# Patient Record
Sex: MALE | Race: WHITE | NOT HISPANIC OR LATINO | URBAN - METROPOLITAN AREA
[De-identification: names, ages, dates, MRNs, and addresses within clinical notes are randomized per-mention and may not be internally consistent; named-entity substitution may affect disease eponyms.]

---

## 2018-07-10 PROBLEM — 428283002 HISTORY OF POLYP OF COLON (SITUATION): Status: ACTIVE | Noted: 2018-07-10

## 2018-07-10 PROBLEM — 414916001 OBESITY: Status: ACTIVE | Noted: 2018-07-10

## 2021-05-18 ENCOUNTER — OUT OF OFFICE VISIT (OUTPATIENT)
Dept: URBAN - METROPOLITAN AREA MEDICAL CENTER 8 | Facility: MEDICAL CENTER | Age: 66
End: 2021-05-18
Payer: MEDICARE

## 2021-05-18 DIAGNOSIS — I48.92 ATRIAL FLUTTER: ICD-10-CM

## 2021-05-18 DIAGNOSIS — R18.8 ABDOMINAL FLUID COLLECTION: ICD-10-CM

## 2021-05-18 DIAGNOSIS — D64.89 ANEMIA DUE TO OTHER CAUSE: ICD-10-CM

## 2021-05-18 PROCEDURE — 99232 SBSQ HOSP IP/OBS MODERATE 35: CPT | Performed by: INTERNAL MEDICINE

## 2021-05-18 PROCEDURE — 99223 1ST HOSP IP/OBS HIGH 75: CPT | Performed by: INTERNAL MEDICINE

## 2021-05-18 PROCEDURE — G8427 DOCREV CUR MEDS BY ELIG CLIN: HCPCS | Performed by: INTERNAL MEDICINE

## 2021-05-20 ENCOUNTER — OUT OF OFFICE VISIT (OUTPATIENT)
Dept: URBAN - METROPOLITAN AREA MEDICAL CENTER 8 | Facility: MEDICAL CENTER | Age: 66
End: 2021-05-20
Payer: MEDICARE

## 2021-05-20 DIAGNOSIS — D12.5 ADENOMA OF SIGMOID COLON: ICD-10-CM

## 2021-05-20 DIAGNOSIS — D64.89 ANEMIA DUE TO OTHER CAUSE: ICD-10-CM

## 2021-05-20 DIAGNOSIS — I85.00 ESOPHAGEAL  VARICOSE VEINS: ICD-10-CM

## 2021-05-20 PROCEDURE — G0500 MOD SEDAT ENDO SERVICE >5YRS: HCPCS | Performed by: INTERNAL MEDICINE

## 2021-05-20 PROCEDURE — 43235 EGD DIAGNOSTIC BRUSH WASH: CPT | Performed by: INTERNAL MEDICINE

## 2021-05-20 PROCEDURE — 45385 COLONOSCOPY W/LESION REMOVAL: CPT | Performed by: INTERNAL MEDICINE

## 2021-06-03 ENCOUNTER — LAB OUTSIDE AN ENCOUNTER (OUTPATIENT)
Dept: URBAN - METROPOLITAN AREA CLINIC 86 | Facility: CLINIC | Age: 66
End: 2021-06-03

## 2021-06-03 ENCOUNTER — OFFICE VISIT (OUTPATIENT)
Dept: URBAN - METROPOLITAN AREA CLINIC 86 | Facility: CLINIC | Age: 66
End: 2021-06-03
Payer: MEDICARE

## 2021-06-03 DIAGNOSIS — I85.00 ESOPHAGEAL VARICES DETERMINED BY ENDOSCOPY: ICD-10-CM

## 2021-06-03 DIAGNOSIS — R18.8 OTHER ASCITES: ICD-10-CM

## 2021-06-03 DIAGNOSIS — R74.8 ABNORMAL LIVER ENZYMES: ICD-10-CM

## 2021-06-03 DIAGNOSIS — K74.60 UNSPECIFIED CIRRHOSIS OF LIVER: ICD-10-CM

## 2021-06-03 DIAGNOSIS — Z71.89 VACCINE COUNSELING: ICD-10-CM

## 2021-06-03 PROCEDURE — 99204 OFFICE O/P NEW MOD 45 MIN: CPT

## 2021-06-03 RX ORDER — FUROSEMIDE 20 MG/1
1 TABLET TABLET ORAL ONCE A DAY
Status: ACTIVE | COMMUNITY

## 2021-06-03 RX ORDER — APIXABAN 2.5 MG/1
AS DIRECTED TABLET, FILM COATED ORAL
Status: ACTIVE | COMMUNITY

## 2021-06-03 RX ORDER — SPIRONOLACTONE 50 MG/1
1 TABLET TABLET, FILM COATED ORAL ONCE A DAY
Status: ACTIVE | COMMUNITY

## 2021-06-03 RX ORDER — CARVEDILOL 3.12 MG/1
1 TABLET WITH FOOD TABLET, FILM COATED ORAL TWICE A DAY
Status: ACTIVE | COMMUNITY

## 2021-06-03 NOTE — PHYSICAL EXAM GASTROINTESTINAL
Abdomen , soft, nontender, distended , no guarding or rigidity , no masses palpable , normal bowel sounds ,

## 2021-06-03 NOTE — HPI-TODAY'S VISIT:
Patient was referred by dr. hernandez for an evaluation of cirrhosis and ascites  A copy of this note will be sent to the referring provider.    pt is a 66 yo white male who was told he had fatty liver 2 years ago. states 'stomach getting bigger' in april timeframe. per pt was drinking more after jan 2021 and then stopped on may 15, 2021 when he was hospitalized. pt's albumin was wnl.   Pt with no ascites last year when screened for AAA via u/s. recently in the hospital at [facility]. he was found to have symptomatic anemia-received iv transfusion for this. pt was noted to have hx of etoh abuse. will complete the screens.  due to ascites he was started on lasix and aldactone.  due to varices on coreg as well.   CT triple phase 5/21/21 showed no definitive evidence of HCC, no enhancing liver masses, cirrhotic appearance of the liver with small to moderate intra-abdominal ascites and probable upper abdominal varices.  Esophageal and gastric varices may be present, patent portal veins.   Paracentesis 5/20/21 with 8.2L of fluid removed- path with numerous meseothelial cells and histiocytes, no atypical or malignant cells present.  Colonoscopy 5/20/21- severe left sided diverticulosis, tortuous left colon, 4 mm sessile sigmoid colon polyp removed, small nonbleeding internal hemorrhoids, limited exam beause of suboptimal prep, repeat 3 years.  EGD 5/20/21- 2 cords of small nonbleeding grade 1-2 esophgeal varices in the distal esophagus.    U/S doppler 5/19/21- hepatopetal flow of the liver. Pt here for follow up.  echo showed EF 55-60%  Last labs were may 2021: cr 0.89 tb 0.8 alt 20 alp 134 ast 58 wbc 6.2 hgb 9.8 plt 247   Duration of visit 45 mins with over 50% of the time explaining patients condition and treatment plan and reviewing records

## 2021-06-09 ENCOUNTER — TELEPHONE ENCOUNTER (OUTPATIENT)
Dept: URBAN - METROPOLITAN AREA CLINIC 92 | Facility: CLINIC | Age: 66
End: 2021-06-09

## 2021-06-09 LAB
A/G RATIO: 1.6
ACTIN (SMOOTH MUSCLE) ANTIBODY: 9
ALBUMIN: 4.1
ALKALINE PHOSPHATASE: 106
ALPHA-1-ANTITRYPSIN, SERUM: 155
ALT (SGPT): 9
ANA DIRECT: NEGATIVE
AST (SGOT): 34
BASO (ABSOLUTE): 0.1
BASOS: 1
BILIRUBIN, TOTAL: 0.9
BUN/CREATININE RATIO: 16
BUN: 13
CALCIUM: 9
CARBON DIOXIDE, TOTAL: 22
CERULOPLASMIN: 32
CHLORIDE: 103
CREATININE: 0.8
EGFR IF AFRICN AM: 108
EGFR IF NONAFRICN AM: 93
EOS (ABSOLUTE): 0.1
EOS: 1
FERRITIN, SERUM: 24
GLOBULIN, TOTAL: 2.6
GLUCOSE: 94
HCV GENOTYPE: (no result)
HCV LOG10: (no result)
HEMATOCRIT: 32.4
HEMATOLOGY COMMENTS:: (no result)
HEMOGLOBIN: 9.8
HEPATITIS C QUANTITATION: (no result)
IMMATURE CELLS: (no result)
IMMATURE GRANS (ABS): 0
IMMATURE GRANULOCYTES: 0
IRON BIND.CAP.(TIBC): 388
IRON SATURATION: 7
IRON: 28
LYMPHS (ABSOLUTE): 1.3
LYMPHS: 19
MCH: 24.9
MCHC: 30.2
MCV: 82
MITOCHONDRIAL (M2) ANTIBODY: <20
MONOCYTES(ABSOLUTE): 1.2
MONOCYTES: 17
NEUTROPHILS (ABSOLUTE): 4.4
NEUTROPHILS: 62
NRBC: (no result)
PHENOTYPE (PI): (no result)
PLATELETS: 239
POTASSIUM: 4.7
PROTEIN, TOTAL: 6.7
RBC: 3.94
RDW: 18
SODIUM: 141
TEST INFORMATION:: (no result)
UIBC: 360
WBC: 7

## 2021-06-09 NOTE — HPI-TODAY'S VISIT:
hep c negative, iron sat low 7%-follow up with pcp or gi for this. ceruloplasmin 32. ferritin low 24. asma neg. ama neg. alpha 1 MM. shavonne neg. ast 34, alt 9. hgb low 9.8-need to see gi or pcp. plt 239. monocytes 1200.

## 2021-07-01 ENCOUNTER — LAB OUTSIDE AN ENCOUNTER (OUTPATIENT)
Dept: URBAN - METROPOLITAN AREA CLINIC 86 | Facility: CLINIC | Age: 66
End: 2021-07-01

## 2021-07-27 ENCOUNTER — TELEPHONE ENCOUNTER (OUTPATIENT)
Dept: URBAN - METROPOLITAN AREA CLINIC 92 | Facility: CLINIC | Age: 66
End: 2021-07-27

## 2021-07-28 ENCOUNTER — TELEPHONE ENCOUNTER (OUTPATIENT)
Dept: URBAN - METROPOLITAN AREA CLINIC 92 | Facility: CLINIC | Age: 66
End: 2021-07-28

## 2021-07-28 NOTE — HPI-TODAY'S VISIT:
Mookie Geronimo, July 26 MRI back. Lower thorax was normal.  Liver showed some fat deposition as well as changes of chronic liver disease including lobar redistribution and nodular contour.  They also see some delayed particular enhancement of the liver which would be compatible with fibrosis. They see perfusion changes throughout the liver but no suspicious lesions.   Typically we redo an MRI in 6 months to follow these perfusion anomalies. They see varices near the esophagus, stomach and spleen area.  Some of the paraesophageal varices are submucosal.  So this is important to follow with egd as you are doing. The gallbladder and biliary tree was normal.   The spleen was 13.1 cm which many would consider slightly enlarged.  Pancreas was normal.  Simple left renal cyst was seen but no size given.  Some nonspecific lymph nodes were seen near the liver. They feel that those lymph nodes are likely reactive. You do have trace ascites so would be very important for you to remain on a low-salt diet and on your diuretics that you are on.  Low-salt diet is really the basis for the ascites control and then the diuretics help out more if you are already on that type of diet. Some mild degenerative changes were seen of the spine. Overall we would recommend repeat the MRI in 6 months. Important to note that on the previous CT from May of this year you had a cirrhotic appearance of liver but moderate intra-abdominal ascites that as well as the probable varices as well. The EGD was done on May 20 of this year and it showed 2 cords of small grade 1-2 esophageal varices that were seen. Please share this information with your providers. This will be reviewed further in person at your next visit.  Dr. Diego

## 2021-08-02 ENCOUNTER — TELEPHONE ENCOUNTER (OUTPATIENT)
Dept: URBAN - METROPOLITAN AREA SURGERY CENTER 30 | Facility: SURGERY CENTER | Age: 66
End: 2021-08-02

## 2021-08-03 ENCOUNTER — OFFICE VISIT (OUTPATIENT)
Dept: URBAN - METROPOLITAN AREA CLINIC 86 | Facility: CLINIC | Age: 66
End: 2021-08-03

## 2021-08-04 ENCOUNTER — TELEPHONE ENCOUNTER (OUTPATIENT)
Dept: URBAN - METROPOLITAN AREA CLINIC 92 | Facility: CLINIC | Age: 66
End: 2021-08-04

## 2021-08-04 LAB
A/G RATIO: 1.5
ALBUMIN: 4.6
ALKALINE PHOSPHATASE: 129
ALT (SGPT): 16
AST (SGOT): 53
BASO (ABSOLUTE): 0.1
BASOS: 1
BILIRUBIN, TOTAL: 1.7
BUN/CREATININE RATIO: 16
BUN: 14
CALCIUM: 9.6
CARBON DIOXIDE, TOTAL: 24
CHLORIDE: 102
CREATININE: 0.88
EGFR IF AFRICN AM: 103
EGFR IF NONAFRICN AM: 90
EOS (ABSOLUTE): 0.1
EOS: 1
GLOBULIN, TOTAL: 3
GLUCOSE: 109
HEMATOCRIT: 41.3
HEMATOLOGY COMMENTS:: (no result)
HEMOGLOBIN: 12.9
IMMATURE CELLS: (no result)
IMMATURE GRANS (ABS): 0
IMMATURE GRANULOCYTES: 0
INR: 1.2
LYMPHS (ABSOLUTE): 1.2
LYMPHS: 17
MCH: 27.4
MCHC: 31.2
MCV: 88
MONOCYTES(ABSOLUTE): 1.2
MONOCYTES: 16
NEUTROPHILS (ABSOLUTE): 4.6
NEUTROPHILS: 65
NRBC: (no result)
PLATELETS: 147
POTASSIUM: 4.6
PROTEIN, TOTAL: 7.6
PROTHROMBIN TIME: 12.4
RBC: 4.7
RDW: 21.1
SODIUM: 140
WBC: 7.1

## 2021-08-31 ENCOUNTER — OFFICE VISIT (OUTPATIENT)
Dept: URBAN - METROPOLITAN AREA CLINIC 86 | Facility: CLINIC | Age: 66
End: 2021-08-31
Payer: MEDICARE

## 2021-08-31 ENCOUNTER — TELEPHONE ENCOUNTER (OUTPATIENT)
Dept: URBAN - METROPOLITAN AREA CLINIC 86 | Facility: CLINIC | Age: 66
End: 2021-08-31

## 2021-08-31 VITALS
HEART RATE: 72 BPM | TEMPERATURE: 95.9 F | WEIGHT: 212.5 LBS | BODY MASS INDEX: 41.72 KG/M2 | SYSTOLIC BLOOD PRESSURE: 124 MMHG | DIASTOLIC BLOOD PRESSURE: 78 MMHG | HEIGHT: 60 IN

## 2021-08-31 DIAGNOSIS — Z71.89 VACCINE COUNSELING: ICD-10-CM

## 2021-08-31 DIAGNOSIS — Z79.899 HIGH RISK MEDICATION USE: ICD-10-CM

## 2021-08-31 DIAGNOSIS — R18.8 OTHER ASCITES: ICD-10-CM

## 2021-08-31 DIAGNOSIS — K76.0 FATTY LIVER: ICD-10-CM

## 2021-08-31 DIAGNOSIS — K74.69 OTHER CIRRHOSIS OF LIVER: ICD-10-CM

## 2021-08-31 DIAGNOSIS — I85.10 SECONDARY ESOPHAGEAL VARICES WITHOUT BLEEDING: ICD-10-CM

## 2021-08-31 DIAGNOSIS — E66.01 MORBID (SEVERE) OBESITY DUE TO EXCESS CALORIES: ICD-10-CM

## 2021-08-31 DIAGNOSIS — F10.10 ALCOHOL ABUSE: ICD-10-CM

## 2021-08-31 PROCEDURE — 99214 OFFICE O/P EST MOD 30 MIN: CPT | Performed by: PHYSICIAN ASSISTANT

## 2021-08-31 RX ORDER — NADOLOL 20 MG/1
2 TABLETS TABLET ORAL ONCE A DAY
Status: ACTIVE | COMMUNITY

## 2021-08-31 RX ORDER — APIXABAN 2.5 MG/1
AS DIRECTED TABLET, FILM COATED ORAL
Status: ACTIVE | COMMUNITY

## 2021-08-31 RX ORDER — LEVOTHYROXINE SODIUM 125 UG/1
1 TABLET IN THE MORNING ON AN EMPTY STOMACH TABLET ORAL ONCE A DAY
Status: ACTIVE | COMMUNITY

## 2021-08-31 RX ORDER — CARVEDILOL 3.12 MG/1
1 TABLET WITH FOOD TABLET, FILM COATED ORAL TWICE A DAY
Status: ACTIVE | COMMUNITY

## 2021-08-31 RX ORDER — FOLIC ACID 0.8 MG
1 TABLET TABLET ORAL ONCE A DAY
Status: ACTIVE | COMMUNITY

## 2021-08-31 RX ORDER — SPIRONOLACTONE 25 MG/1
1/2 TABLET TABLET, FILM COATED ORAL ONCE A DAY
Status: ACTIVE | COMMUNITY

## 2021-08-31 RX ORDER — METRONIDAZOLE 7.5 MG/G
1 APPLICATION CREAM TOPICAL TWICE A DAY
Status: ACTIVE | COMMUNITY

## 2021-08-31 RX ORDER — FUROSEMIDE 20 MG/1
1 TABLET TABLET ORAL ONCE A DAY
Status: ACTIVE | COMMUNITY

## 2021-08-31 NOTE — HPI-TODAY'S VISIT:
Patient is  a 66 yo white male who presents for follow up of cirrhosis with ascites due to fatty liver and alcohol, last seen 6/3/21 by Terra Benitez PA-C.  He was referred by Dr. Borges.   A copy of this note will be sent to the referring provider.  pt is a 66 yo white male who was told he had fatty liver 2 years ago. states 'stomach getting bigger' in april 2021 timeframe. per pt was drinking more after jan 2021 and then stopped on may 15, 2021 when he was hospitalized.  Has since started drinking again.    Currently drinking 2 glasses of wine x 4 days a week.  Previously drinking 3-4 drinks daily.   Encouraged absolute alcohol cessation and rehab/therapy.  Alcohol use and 5 lb weight gain likely cause of recent bump in liver enzymes.  8/12/21-  alk phos 111 (prior 129), ast 66 (prior 53), alt 23 (prior 16).  (No new herbals/supplements.  No exposure to chemicals/toxins).    Weight 237 11/2020.  205 1/2021 after stomach flu, 225 prior to para on 5/16/21.  Weight 216 in June 2021.  Weight 212 today.   Feels like weight has gone up 5 lbs in 2 weeks, not eating as healthy as previously and eating later in day.     Needs continued f/u with GI for EGD variceal screening and BRITNEY.  Most recent /21 showing trace ascites (prior moderate on CT in May 2021)- on lasix 20 mg, spironolactone 12.5 mg/day and doing well with this.  Eating less salt but could do better with this.  No HSE, no signs of GI bleeding, no abdominal pain, jaundice, pruritus, fevers, chills, increased abdominal distention.  labs 8/12/21- sodium 139, potassium 3.8, chloride 103, creatinine 0.77, tbili 0.9, alk phos 111, ast 66, alt 23, hgb 12.2, iron sat 48  labs 8/3/21- glucose 109, tb 1.7 elevated. alp 129 elevated, ast 53 elevated. alt 16. hgb 12.9 low but better. plt [id number]. monocytes 1.2 elevated. inr 1.2. MELD 10.  July 26 MRI- Lower thorax was normal.  Liver showed some fat deposition as well as changes of chronic liver disease including lobar redistribution and nodular contour.  They also see some delayed particular enhancement of the liver which would be compatible with fibrosis. They see perfusion changes throughout the liver but no suspicious lesions.   Typically we redo an MRI in 6 months to follow these perfusion anomalies. They see varices near the esophagus, stomach and spleen area.  Some of the paraesophageal varices are submucosal.  So this is important to follow with egd as you are doing. The gallbladder and biliary tree was normal.   The spleen was 13.1 cm which many would consider slightly enlarged.  Pancreas was normal.  Simple left renal cyst was seen but no size given.  Some nonspecific lymph nodes were seen near the liver. They feel that those lymph nodes are likely reactive. You do have trace ascites so would be very important for you to remain on a low-salt diet and on your diuretics that you are on.  Low-salt diet is really the basis for the ascites control and then the diuretics help out more if you are already on that type of diet. Some mild degenerative changes were seen of the spine. Overall we would recommend repeat the MRI in 6 months. Important to note that on the previous CT from May of this year you had a cirrhotic appearance of liver but moderate intra-abdominal ascites that as well as the probable varices as well. The EGD was done on May 20 of this year and it showed 2 cords of small grade 1-2 esophageal varices that were seen.  hep c negative, iron sat low 7%-follow up with pcp or gi for this. ceruloplasmin 32. ferritin low 24. asma neg. ama neg. alpha 1 MM. shavonne neg. ast 34, alt 9. hgb low 9.8-need to see gi or pcp. plt 239. monocytes 1200.   Pt with no ascites last year when screened for AAA via u/s. recently in the hospital at [facility]. he was found to have symptomatic anemia-received iv transfusion for this. pt was noted to have hx of etoh abuse. will complete the screens.  due to ascites he was started on lasix and aldactone.  due to varices on coreg as well.   CT triple phase 5/21/21 showed no definitive evidence of HCC, no enhancing liver masses, cirrhotic appearance of the liver with small to moderate intra-abdominal ascites and probable upper abdominal varices.  Esophageal and gastric varices may be present, patent portal veins.   Paracentesis 5/20/21 with 8.2L of fluid removed- path with numerous meseothelial cells and histiocytes, no atypical or malignant cells present.  Colonoscopy 5/20/21- severe left sided diverticulosis, tortuous left colon, 4 mm sessile sigmoid colon polyp removed, small nonbleeding internal hemorrhoids, limited exam beause of suboptimal prep, repeat 3 years.  EGD 5/20/21- 2 cords of small nonbleeding grade 1-2 esophgeal varices in the distal esophagus.    U/S doppler 5/19/21- hepatopetal flow of the liver. Pt here for follow up.  echo showed EF 55-60%  Last labs were may 2021: cr 0.89 tb 0.8 alt 20 alp 134 ast 58 wbc 6.2 hgb 9.8 plt 247

## 2021-09-01 ENCOUNTER — LAB OUTSIDE AN ENCOUNTER (OUTPATIENT)
Dept: URBAN - METROPOLITAN AREA CLINIC 86 | Facility: CLINIC | Age: 66
End: 2021-09-01

## 2021-09-28 ENCOUNTER — LAB OUTSIDE AN ENCOUNTER (OUTPATIENT)
Dept: URBAN - METROPOLITAN AREA CLINIC 86 | Facility: CLINIC | Age: 66
End: 2021-09-28

## 2021-10-07 ENCOUNTER — TELEPHONE ENCOUNTER (OUTPATIENT)
Dept: URBAN - METROPOLITAN AREA CLINIC 92 | Facility: CLINIC | Age: 66
End: 2021-10-07

## 2021-10-07 LAB
A/G RATIO: 1.7
ALBUMIN: 4.3
ALKALINE PHOSPHATASE: 127
ALT (SGPT): 14
AST (SGOT): 48
BASO (ABSOLUTE): 0.1
BASOS: 1
BILIRUBIN, TOTAL: 0.8
BUN/CREATININE RATIO: 15
BUN: 11
CALCIUM: 8.9
CARBON DIOXIDE, TOTAL: 26
CHLORIDE: 106
CREATININE: 0.74
EGFR IF AFRICN AM: 111
EGFR IF NONAFRICN AM: 96
EOS (ABSOLUTE): 0.1
EOS: 1
GLOBULIN, TOTAL: 2.5
GLUCOSE: 103
HEMATOCRIT: 23.3
HEMATOLOGY COMMENTS:: (no result)
HEMOGLOBIN: 6.8
IMMATURE CELLS: (no result)
IMMATURE GRANS (ABS): 0
IMMATURE GRANULOCYTES: 0
INR: 1.1
LYMPHS (ABSOLUTE): 1.4
LYMPHS: 19
MCH: 26.1
MCHC: 29.2
MCV: 89
MONOCYTES(ABSOLUTE): 1.1
MONOCYTES: 15
NEUTROPHILS (ABSOLUTE): 4.5
NEUTROPHILS: 64
NRBC: (no result)
PLATELETS: 177
POTASSIUM: 4.1
PROTEIN, TOTAL: 6.8
PROTHROMBIN TIME: 11.6
RBC: 2.61
RDW: 20.1
SODIUM: 142
WBC: 7.2

## 2021-10-14 ENCOUNTER — LAB OUTSIDE AN ENCOUNTER (OUTPATIENT)
Dept: URBAN - METROPOLITAN AREA CLINIC 86 | Facility: CLINIC | Age: 66
End: 2021-10-14

## 2021-10-14 ENCOUNTER — TELEPHONE ENCOUNTER (OUTPATIENT)
Dept: URBAN - METROPOLITAN AREA CLINIC 86 | Facility: CLINIC | Age: 66
End: 2021-10-14

## 2021-10-14 ENCOUNTER — OFFICE VISIT (OUTPATIENT)
Dept: URBAN - METROPOLITAN AREA CLINIC 86 | Facility: CLINIC | Age: 66
End: 2021-10-14
Payer: MEDICARE

## 2021-10-14 DIAGNOSIS — K74.60 UNSPECIFIED CIRRHOSIS OF LIVER: ICD-10-CM

## 2021-10-14 DIAGNOSIS — R74.8 ABNORMAL LIVER ENZYMES: ICD-10-CM

## 2021-10-14 DIAGNOSIS — R18.8 OTHER ASCITES: ICD-10-CM

## 2021-10-14 DIAGNOSIS — I85.00 ESOPHAGEAL VARICES DETERMINED BY ENDOSCOPY: ICD-10-CM

## 2021-10-14 DIAGNOSIS — E66.01 MORBID (SEVERE) OBESITY DUE TO EXCESS CALORIES: ICD-10-CM

## 2021-10-14 DIAGNOSIS — F10.10 ALCOHOL ABUSE: ICD-10-CM

## 2021-10-14 DIAGNOSIS — Z71.89 VACCINE COUNSELING: ICD-10-CM

## 2021-10-14 DIAGNOSIS — K76.0 FATTY LIVER: ICD-10-CM

## 2021-10-14 DIAGNOSIS — Z79.899 HIGH RISK MEDICATION USE: ICD-10-CM

## 2021-10-14 PROCEDURE — 99214 OFFICE O/P EST MOD 30 MIN: CPT | Performed by: PHYSICIAN ASSISTANT

## 2021-10-14 RX ORDER — SPIRONOLACTONE 25 MG/1
1/2 TABLET TABLET, FILM COATED ORAL ONCE A DAY
Status: ACTIVE | COMMUNITY

## 2021-10-14 RX ORDER — APIXABAN 2.5 MG/1
AS DIRECTED TABLET, FILM COATED ORAL
Status: ACTIVE | COMMUNITY

## 2021-10-14 RX ORDER — METRONIDAZOLE 7.5 MG/G
1 APPLICATION CREAM TOPICAL TWICE A DAY
Status: ACTIVE | COMMUNITY

## 2021-10-14 RX ORDER — FUROSEMIDE 20 MG/1
1 TABLET TABLET ORAL ONCE A DAY
Status: ACTIVE | COMMUNITY

## 2021-10-14 RX ORDER — FOLIC ACID 0.8 MG
1 TABLET TABLET ORAL ONCE A DAY
Status: ACTIVE | COMMUNITY

## 2021-10-14 RX ORDER — NADOLOL 20 MG/1
1 TABLET TABLET ORAL ONCE A DAY
Status: ACTIVE | COMMUNITY

## 2021-10-14 RX ORDER — CARVEDILOL 3.12 MG/1
1 TABLET WITH FOOD TABLET, FILM COATED ORAL TWICE A DAY
Status: ON HOLD | COMMUNITY

## 2021-10-14 RX ORDER — LEVOTHYROXINE SODIUM 125 UG/1
1 TABLET IN THE MORNING ON AN EMPTY STOMACH TABLET ORAL ONCE A DAY
Status: ACTIVE | COMMUNITY

## 2021-10-14 NOTE — HPI-TODAY'S VISIT:
Patient is  a 64 yo white male who presents for follow up of cirrhosis with ascites due to fatty liver and alcohol, last seen 8/31/21.  He was referred by Dr. Borges.   A copy of this note will be sent to the referring provider.  Since last OV, he was evaluated at [facility] on 10/7/21-10/11/21 for hgb 6.8 (also on eliquis)- admitted and given 2 units blood, hgb up to 9.1. Not enough fluid to tap per US.  No scopes performed.    He notes 3 days of small volume hematemesis in middle of Sept.  Wasn't eating much at the time.  Denies any recent dark/bloody stools, hematemesis, signs of bleeding.   Went for run yesterday, made it about a mile.   Weight 206 in hospital, 215 today.  He notes systolic BP <100 on several occasions since hospitalization.  They stopped carvedilol.  Takes Nadolol 20 mg- advised to stop this and f/u with pcp as low BP and bradycardia today in office.    He was told he had fatty liver 2 years ago. states 'stomach getting bigger' in april 2021 timeframe. per pt was drinking more after jan 2021 and then stopped on may 15, 2021 when he was hospitalized.  Has since started drinking again.    Currently drinking 2 glasses of wine per day.  Previously drinking 3-4 drinks daily.   Encouraged absolute alcohol cessation and rehab/therapy.  No new herbals/supplements.  No exposure to chemicals/toxins.   alk phos 127 (prev 129), ast 48 (prev 53), alt 14 (prev 16), MELD 7 on 10/6/21 labs.  Weight 237 11/2020.  205 1/2021 after stomach flu, 225 prior to para on 5/16/21.  Weight 216 in June 2021.  Weight 212 August.  Needs continued f/u with GI for EGD variceal screening and BRITNEY.  Last EGD 5/20/21- 2 cords of small nonbleeding grade 1-2 esophgeal varices in the distal esophagus.  Most recent MRI 6/26/21 showing trace ascites (prior moderate on CT in May 2021)- on lasix 20 mg, spironolactone 12.5 mg/day and doing well with this.  Eating less salt but could do better with this.  No HSE, no signs of GI bleeding, no abdominal pain, jaundice, pruritus, fevers, chills, increased abdominal distention.  Was recommended to go to ED after 10/6/21 labs showing hgb 6.8.    labs 10/6/21- wbc 7.2, hgb 6.8 (LL), plt 177 (prev 147), glucose 103, creatinine 0.74, sodium 142, potassium 4.1, tbili 0.8 (prev 1.7), alk phos 127 (prev 129), ast 48 (prev 53), alt 14 (prev 16), inr 1.1.  MELD 7 and MELD NA 7.  labs 8/12/21- sodium 139, potassium 3.8, chloride 103, creatinine 0.77, tbili 0.9, alk phos 111, ast 66, alt 23, hgb 12.2, iron sat 48  labs 8/3/21- glucose 109, tb 1.7 elevated. alp 129 elevated, ast 53 elevated. alt 16. hgb 12.9 low but better. plt [id number]. monocytes 1.2 elevated. inr 1.2. MELD 10.  July 26 MRI- Lower thorax was normal.  Liver showed some fat deposition as well as changes of chronic liver disease including lobar redistribution and nodular contour.  They also see some delayed particular enhancement of the liver which would be compatible with fibrosis. They see perfusion changes throughout the liver but no suspicious lesions.   Typically we redo an MRI in 6 months to follow these perfusion anomalies. They see varices near the esophagus, stomach and spleen area.  Some of the paraesophageal varices are submucosal.  So this is important to follow with egd as you are doing. The gallbladder and biliary tree was normal.   The spleen was 13.1 cm which many would consider slightly enlarged.  Pancreas was normal.  Simple left renal cyst was seen but no size given.  Some nonspecific lymph nodes were seen near the liver. They feel that those lymph nodes are likely reactive. You do have trace ascites so would be very important for you to remain on a low-salt diet and on your diuretics that you are on.  Low-salt diet is really the basis for the ascites control and then the diuretics help out more if you are already on that type of diet. Some mild degenerative changes were seen of the spine. Overall we would recommend repeat the MRI in 6 months. Important to note that on the previous CT from May of this year you had a cirrhotic appearance of liver but moderate intra-abdominal ascites that as well as the probable varices as well. The EGD was done on May 20 of this year and it showed 2 cords of small grade 1-2 esophageal varices that were seen.  hep c negative, iron sat low 7%-follow up with pcp or gi for this. ceruloplasmin 32. ferritin low 24. asma neg. ama neg. alpha 1 MM. shavonne neg. ast 34, alt 9. hgb low 9.8-need to see gi or pcp. plt 239. monocytes 1200.   Pt with no ascites last year when screened for AAA via u/s. recently in the hospital at [facility]. he was found to have symptomatic anemia-received iv transfusion for this. pt was noted to have hx of etoh abuse. will complete the screens.  due to ascites he was started on lasix and aldactone.  due to varices on coreg as well.   CT triple phase 5/21/21 showed no definitive evidence of HCC, no enhancing liver masses, cirrhotic appearance of the liver with small to moderate intra-abdominal ascites and probable upper abdominal varices.  Esophageal and gastric varices may be present, patent portal veins.   Paracentesis 5/20/21 with 8.2L of fluid removed- path with numerous meseothelial cells and histiocytes, no atypical or malignant cells present.  Colonoscopy 5/20/21- severe left sided diverticulosis, tortuous left colon, 4 mm sessile sigmoid colon polyp removed, small nonbleeding internal hemorrhoids, limited exam beause of suboptimal prep, repeat 3 years.  EGD 5/20/21- 2 cords of small nonbleeding grade 1-2 esophgeal varices in the distal esophagus.    U/S doppler 5/19/21- hepatopetal flow of the liver. Pt here for follow up.  echo showed EF 55-60%  Last labs were may 2021: cr 0.89 tb 0.8 alt 20 alp 134 ast 58 wbc 6.2 hgb 9.8 plt 247

## 2021-10-18 ENCOUNTER — LAB OUTSIDE AN ENCOUNTER (OUTPATIENT)
Dept: URBAN - METROPOLITAN AREA CLINIC 86 | Facility: CLINIC | Age: 66
End: 2021-10-18

## 2021-10-22 ENCOUNTER — TELEPHONE ENCOUNTER (OUTPATIENT)
Dept: URBAN - METROPOLITAN AREA CLINIC 86 | Facility: CLINIC | Age: 66
End: 2021-10-22

## 2021-10-28 ENCOUNTER — LAB OUTSIDE AN ENCOUNTER (OUTPATIENT)
Dept: URBAN - METROPOLITAN AREA CLINIC 86 | Facility: CLINIC | Age: 66
End: 2021-10-28

## 2021-10-29 ENCOUNTER — OFFICE VISIT (OUTPATIENT)
Dept: URBAN - METROPOLITAN AREA CLINIC 25 | Facility: CLINIC | Age: 66
End: 2021-10-29

## 2021-10-29 ENCOUNTER — LAB OUTSIDE AN ENCOUNTER (OUTPATIENT)
Dept: URBAN - METROPOLITAN AREA CLINIC 92 | Facility: CLINIC | Age: 66
End: 2021-10-29

## 2021-10-29 ENCOUNTER — TELEPHONE ENCOUNTER (OUTPATIENT)
Dept: URBAN - METROPOLITAN AREA CLINIC 92 | Facility: CLINIC | Age: 66
End: 2021-10-29

## 2021-10-29 LAB
A/G RATIO: 1.4
ALBUMIN: 4.7
ALKALINE PHOSPHATASE: 140
ALT (SGPT): 24
AST (SGOT): 71
BASO (ABSOLUTE): 0.1
BASOS: 1
BILIRUBIN, TOTAL: 0.8
BUN/CREATININE RATIO: 13
BUN: 9
CALCIUM: 9.3
CARBON DIOXIDE, TOTAL: 21
CHLORIDE: 103
CREATININE: 0.7
EGFR IF AFRICN AM: 114
EGFR IF NONAFRICN AM: 98
EOS (ABSOLUTE): 0.1
EOS: 1
GLOBULIN, TOTAL: 3.3
GLUCOSE: 93
HEMATOCRIT: 38
HEMATOLOGY COMMENTS:: (no result)
HEMOGLOBIN: 11.6
HEP A AB, TOTAL: POSITIVE
HEPATITIS B SURF AB QUANT: 665.9
IMMATURE CELLS: (no result)
IMMATURE GRANS (ABS): 0
IMMATURE GRANULOCYTES: 1
INR: 1.1
LYMPHS (ABSOLUTE): 1.4
LYMPHS: 18
MCH: 27.6
MCHC: 30.5
MCV: 91
MONOCYTES(ABSOLUTE): 1.1
MONOCYTES: 14
NEUTROPHILS (ABSOLUTE): 5.1
NEUTROPHILS: 65
NRBC: (no result)
PLATELETS: 145
POTASSIUM: 3.9
PROTEIN, TOTAL: 8
PROTHROMBIN TIME: 11.2
RBC: 4.2
RDW: 19.9
SODIUM: 142
WBC: 7.8

## 2021-10-29 RX ORDER — LEVOTHYROXINE SODIUM 125 UG/1
1 TABLET IN THE MORNING ON AN EMPTY STOMACH TABLET ORAL ONCE A DAY
Status: ACTIVE | COMMUNITY

## 2021-10-29 RX ORDER — SPIRONOLACTONE 25 MG/1
1/2 TABLET TABLET, FILM COATED ORAL ONCE A DAY
Status: ACTIVE | COMMUNITY

## 2021-10-29 RX ORDER — APIXABAN 2.5 MG/1
AS DIRECTED TABLET, FILM COATED ORAL
Status: ACTIVE | COMMUNITY

## 2021-10-29 RX ORDER — CARVEDILOL 3.12 MG/1
1 TABLET WITH FOOD TABLET, FILM COATED ORAL TWICE A DAY
Status: ON HOLD | COMMUNITY

## 2021-10-29 RX ORDER — FUROSEMIDE 20 MG/1
1 TABLET TABLET ORAL ONCE A DAY
Status: ACTIVE | COMMUNITY

## 2021-10-29 RX ORDER — METRONIDAZOLE 7.5 MG/G
1 APPLICATION CREAM TOPICAL TWICE A DAY
Status: ACTIVE | COMMUNITY

## 2021-10-29 RX ORDER — NADOLOL 20 MG/1
1 TABLET TABLET ORAL ONCE A DAY
Status: ACTIVE | COMMUNITY

## 2021-10-29 RX ORDER — FOLIC ACID 0.8 MG
1 TABLET TABLET ORAL ONCE A DAY
Status: ACTIVE | COMMUNITY

## 2021-11-18 ENCOUNTER — TELEPHONE ENCOUNTER (OUTPATIENT)
Dept: URBAN - METROPOLITAN AREA CLINIC 86 | Facility: CLINIC | Age: 66
End: 2021-11-18

## 2021-11-25 ENCOUNTER — LAB OUTSIDE AN ENCOUNTER (OUTPATIENT)
Dept: URBAN - METROPOLITAN AREA CLINIC 86 | Facility: CLINIC | Age: 66
End: 2021-11-25

## 2021-12-03 ENCOUNTER — TELEPHONE ENCOUNTER (OUTPATIENT)
Dept: URBAN - METROPOLITAN AREA CLINIC 86 | Facility: CLINIC | Age: 66
End: 2021-12-03

## 2021-12-08 ENCOUNTER — LAB OUTSIDE AN ENCOUNTER (OUTPATIENT)
Dept: URBAN - METROPOLITAN AREA CLINIC 86 | Facility: CLINIC | Age: 66
End: 2021-12-08

## 2021-12-09 LAB
A/G RATIO: 1.4
ALBUMIN: 4.3
ALKALINE PHOSPHATASE: 123
ALT (SGPT): 23
AST (SGOT): 81
BASO (ABSOLUTE): 0.1
BASOS: 1
BILIRUBIN, TOTAL: 0.8
BUN/CREATININE RATIO: 20
BUN: 13
CALCIUM: 9.3
CARBON DIOXIDE, TOTAL: 24
CHLORIDE: 102
CREATININE: 0.65
EGFR IF AFRICN AM: 116
EGFR IF NONAFRICN AM: 101
EOS (ABSOLUTE): 0.1
EOS: 2
GLOBULIN, TOTAL: 3.1
GLUCOSE: 108
HEMATOCRIT: 34.3
HEMATOLOGY COMMENTS:: (no result)
HEMOGLOBIN: 10.6
IMMATURE CELLS: (no result)
IMMATURE GRANS (ABS): 0
IMMATURE GRANULOCYTES: 0
INR: 1.1
LYMPHS (ABSOLUTE): 1.7
LYMPHS: 20
MCH: 26.6
MCHC: 30.9
MCV: 86
MONOCYTES(ABSOLUTE): 1.2
MONOCYTES: 14
NEUTROPHILS (ABSOLUTE): 5.1
NEUTROPHILS: 63
NRBC: (no result)
PLATELETS: 196
POTASSIUM: 4.2
PROTEIN, TOTAL: 7.4
PROTHROMBIN TIME: 11.6
RBC: 3.98
RDW: 18.2
SODIUM: 140
WBC: 8.2

## 2021-12-17 ENCOUNTER — OFFICE VISIT (OUTPATIENT)
Dept: URBAN - METROPOLITAN AREA MEDICAL CENTER 8 | Facility: MEDICAL CENTER | Age: 66
End: 2021-12-17
Payer: MEDICARE

## 2021-12-17 DIAGNOSIS — K31.89 ACQUIRED DEFORMITY OF DUODENUM: ICD-10-CM

## 2021-12-17 DIAGNOSIS — I85.10 ESOPH VARICE OTHER DIS: ICD-10-CM

## 2021-12-17 DIAGNOSIS — K76.6 CLINICALLY SIGNIFICANT PORTAL HYPERTENSION: ICD-10-CM

## 2021-12-17 PROCEDURE — 43244 EGD VARICES LIGATION: CPT | Performed by: INTERNAL MEDICINE

## 2021-12-17 RX ORDER — SPIRONOLACTONE 25 MG/1
1/2 TABLET TABLET, FILM COATED ORAL ONCE A DAY
Status: ACTIVE | COMMUNITY

## 2021-12-17 RX ORDER — FUROSEMIDE 20 MG/1
1 TABLET TABLET ORAL ONCE A DAY
Status: ACTIVE | COMMUNITY

## 2021-12-17 RX ORDER — FOLIC ACID 0.8 MG
1 TABLET TABLET ORAL ONCE A DAY
Status: ACTIVE | COMMUNITY

## 2021-12-17 RX ORDER — METRONIDAZOLE 7.5 MG/G
1 APPLICATION CREAM TOPICAL TWICE A DAY
Status: ACTIVE | COMMUNITY

## 2021-12-17 RX ORDER — LEVOTHYROXINE SODIUM 125 UG/1
1 TABLET IN THE MORNING ON AN EMPTY STOMACH TABLET ORAL ONCE A DAY
Status: ACTIVE | COMMUNITY

## 2021-12-17 RX ORDER — NADOLOL 20 MG/1
1 TABLET TABLET ORAL ONCE A DAY
Status: ACTIVE | COMMUNITY

## 2021-12-17 RX ORDER — APIXABAN 2.5 MG/1
AS DIRECTED TABLET, FILM COATED ORAL
Status: ACTIVE | COMMUNITY

## 2021-12-17 RX ORDER — CARVEDILOL 3.12 MG/1
1 TABLET WITH FOOD TABLET, FILM COATED ORAL TWICE A DAY
Status: ON HOLD | COMMUNITY

## 2021-12-23 ENCOUNTER — TELEPHONE ENCOUNTER (OUTPATIENT)
Dept: URBAN - METROPOLITAN AREA CLINIC 92 | Facility: CLINIC | Age: 66
End: 2021-12-23

## 2021-12-23 NOTE — HPI-TODAY'S VISIT:
labs 12/6/21 with cardiology office- iron sat 9 low, tibc 519 high, iron serum 45, wbc 8.4 normal, hgb 10.5 low (continue f/u with gi/pcp for anemia eval), platelets 181 normal, rdw 18.4 high, total cholesterol 210 high, trig 104, hdl 42, glucose 103 high, total bili 0.8, alk phos 121 (prev 129), ast 80 high (prev 48), alt 25 (prev 14), creatinine 0.65 low, sodium 138 normal.  Liver enzymes are up from previous.  any changes to medications/weight/alcohol?

## 2021-12-28 ENCOUNTER — OFFICE VISIT (OUTPATIENT)
Dept: URBAN - METROPOLITAN AREA CLINIC 86 | Facility: CLINIC | Age: 66
End: 2021-12-28
Payer: MEDICARE

## 2021-12-28 VITALS
WEIGHT: 206 LBS | BODY MASS INDEX: 40.44 KG/M2 | DIASTOLIC BLOOD PRESSURE: 79 MMHG | HEART RATE: 61 BPM | TEMPERATURE: 97.4 F | HEIGHT: 60 IN | SYSTOLIC BLOOD PRESSURE: 110 MMHG

## 2021-12-28 DIAGNOSIS — K76.0 FATTY LIVER: ICD-10-CM

## 2021-12-28 DIAGNOSIS — E66.01 MORBID (SEVERE) OBESITY DUE TO EXCESS CALORIES: ICD-10-CM

## 2021-12-28 DIAGNOSIS — I85.00 ESOPHAGEAL VARICES DETERMINED BY ENDOSCOPY: ICD-10-CM

## 2021-12-28 DIAGNOSIS — F10.10 ALCOHOL ABUSE: ICD-10-CM

## 2021-12-28 DIAGNOSIS — Z71.89 VACCINE COUNSELING: ICD-10-CM

## 2021-12-28 DIAGNOSIS — R18.8 OTHER ASCITES: ICD-10-CM

## 2021-12-28 DIAGNOSIS — K74.60 UNSPECIFIED CIRRHOSIS OF LIVER: ICD-10-CM

## 2021-12-28 DIAGNOSIS — R74.8 ABNORMAL LIVER ENZYMES: ICD-10-CM

## 2021-12-28 DIAGNOSIS — Z79.899 HIGH RISK MEDICATION USE: ICD-10-CM

## 2021-12-28 PROCEDURE — 99214 OFFICE O/P EST MOD 30 MIN: CPT | Performed by: PHYSICIAN ASSISTANT

## 2021-12-28 RX ORDER — APIXABAN 2.5 MG/1
AS DIRECTED TABLET, FILM COATED ORAL
Status: ACTIVE | COMMUNITY

## 2021-12-28 RX ORDER — FOLIC ACID 0.8 MG
1 TABLET TABLET ORAL ONCE A DAY
Status: ACTIVE | COMMUNITY

## 2021-12-28 RX ORDER — METRONIDAZOLE 7.5 MG/G
1 APPLICATION CREAM TOPICAL TWICE A DAY
Status: ACTIVE | COMMUNITY

## 2021-12-28 RX ORDER — CARVEDILOL 3.12 MG/1
1 TABLET WITH FOOD TABLET, FILM COATED ORAL TWICE A DAY
Status: ON HOLD | COMMUNITY

## 2021-12-28 RX ORDER — NADOLOL 20 MG/1
1 TABLET TABLET ORAL ONCE A DAY
Status: ACTIVE | COMMUNITY

## 2021-12-28 RX ORDER — LEVOTHYROXINE SODIUM 125 UG/1
1 TABLET IN THE MORNING ON AN EMPTY STOMACH TABLET ORAL ONCE A DAY
Status: ACTIVE | COMMUNITY

## 2021-12-28 RX ORDER — FUROSEMIDE 20 MG/1
1 TABLET TABLET ORAL ONCE A DAY
Status: ACTIVE | COMMUNITY

## 2021-12-28 RX ORDER — SPIRONOLACTONE 25 MG/1
1/2 TABLET TABLET, FILM COATED ORAL ONCE A DAY
Status: ACTIVE | COMMUNITY

## 2021-12-28 NOTE — HPI-TODAY'S VISIT:
Patient is  a 67 yo white male who presents for follow up of cirrhosis with ascites due to fatty liver and alcohol, last seen 10/14/21.  He was referred by Dr. Borges.   A copy of this note will be sent to the referring provider.  Pt returns for follow up .  He is s/p EGD 12/17/21- with dr. borges showing grade II EV, s/p 3 bands in distal esophagus, moderate portal hypertensive gastropathy, no gastric varices, no active bleeding.   On nadolol 20 mg BID and tolerating without bradycardia/hypotension (taking consistently since 12/1/21- briefly stopped in Oct due to hypotension/bradycardia).   Was taken off eliquis by cardiologist.  Will f/u with Dr. Borges for repeat EGD  likely 3-6 months. Last blood transfusion was in Oct.  Weight 206 today, prev 215 11/2021.  Continuing to drink 1-2 glasses of wine, 3-4 times week, again advised importance of alcohol abstinenance and need for rehab/counseling.   Iron being managed by pcp- taking iron supplement a couple times per week, causes "stomach upset".   Liver enzymes up from previous and he reports drinking more due to holidays.  Denies signs of GI bleeding, confusion, abdominal pain, jaundice, n/v, change in bowel movements.    labs 12/6/21 with cardiology office- iron sat 9 low, tibc 519 high, iron serum 45, wbc 8.4 normal, hgb 10.5 low (continue f/u with gi/pcp for anemia eval), platelets 181 normal, rdw 18.4 high, total cholesterol 210 high, trig 104, hdl 42, glucose 103 high, total bili 0.8, alk phos 121 (prev 129), ast 80 high (prev 48), alt 25 (prev 14), creatinine 0.65 low, sodium 138 normal.    RECAP:  Since last OV, he was evaluated at [facility] on 10/7/21-10/11/21 for hgb 6.8 (also on eliquis)- admitted and given 2 units blood, hgb up to 9.1. Not enough fluid to tap per US.  No scopes performed.    He notes 3 days of small volume hematemesis in middle of Sept.  Wasn't eating much at the time.   Went for run yesterday, made it about a mile.   Weight 206 in hospital, 215 today.  He notes systolic BP <100 on several occasions since hospitalization.  They stopped carvedilol.  Takes Nadolol 20 mg- advised to stop this and f/u with pcp as low BP and bradycardia today in office.    He was told he had fatty liver 2 years ago. states 'stomach getting bigger' in april 2021 timeframe. per pt was drinking more after jan 2021 and then stopped on may 15, 2021 when he was hospitalized.  Has since started drinking again.    Drinking 2 glasses of wine per day at time of Oct 2021 office visit.  Previously drinking 3-4 drinks daily.   Encouraged absolute alcohol cessation and rehab/therapy.  No new herbals/supplements.  No exposure to chemicals/toxins.   alk phos 127 (prev 129), ast 48 (prev 53), alt 14 (prev 16), MELD 7 on 10/6/21 labs.  Weight 237 11/2020.  205 1/2021 after stomach flu, 225 prior to para on 5/16/21.  Weight 216 in June 2021.  Weight 212 August.  Needs continued f/u with GI for EGD variceal screening and BRITNEY.  Last EGD 5/20/21- 2 cords of small nonbleeding grade 1-2 esophgeal varices in the distal esophagus.  Most recent MRI 6/26/21 showing trace ascites (prior moderate on CT in May 2021)- on lasix 20 mg, spironolactone 12.5 mg/day and doing well with this.  Eating less salt but could do better with this.  No HSE, no signs of GI bleeding, no abdominal pain, jaundice, pruritus, fevers, chills, increased abdominal distention.  Was recommended to go to ED after 10/6/21 labs showing hgb 6.8.    labs 10/6/21- wbc 7.2, hgb 6.8 (LL), plt 177 (prev 147), glucose 103, creatinine 0.74, sodium 142, potassium 4.1, tbili 0.8 (prev 1.7), alk phos 127 (prev 129), ast 48 (prev 53), alt 14 (prev 16), inr 1.1.  MELD 7 and MELD NA 7.  labs 8/12/21- sodium 139, potassium 3.8, chloride 103, creatinine 0.77, tbili 0.9, alk phos 111, ast 66, alt 23, hgb 12.2, iron sat 48  labs 8/3/21- glucose 109, tb 1.7 elevated. alp 129 elevated, ast 53 elevated. alt 16. hgb 12.9 low but better. plt [id number]. monocytes 1.2 elevated. inr 1.2. MELD 10.  July 26 MRI- Lower thorax was normal.  Liver showed some fat deposition as well as changes of chronic liver disease including lobar redistribution and nodular contour.  They also see some delayed particular enhancement of the liver which would be compatible with fibrosis. They see perfusion changes throughout the liver but no suspicious lesions.   Typically we redo an MRI in 6 months to follow these perfusion anomalies. They see varices near the esophagus, stomach and spleen area.  Some of the paraesophageal varices are submucosal.  So this is important to follow with egd as you are doing. The gallbladder and biliary tree was normal.   The spleen was 13.1 cm which many would consider slightly enlarged.  Pancreas was normal.  Simple left renal cyst was seen but no size given.  Some nonspecific lymph nodes were seen near the liver. They feel that those lymph nodes are likely reactive. You do have trace ascites so would be very important for you to remain on a low-salt diet and on your diuretics that you are on.  Low-salt diet is really the basis for the ascites control and then the diuretics help out more if you are already on that type of diet. Some mild degenerative changes were seen of the spine. Overall we would recommend repeat the MRI in 6 months. Important to note that on the previous CT from May of this year you had a cirrhotic appearance of liver but moderate intra-abdominal ascites that as well as the probable varices as well. The EGD was done on May 20 of this year and it showed 2 cords of small grade 1-2 esophageal varices that were seen.  hep c negative, iron sat low 7%-follow up with pcp or gi for this. ceruloplasmin 32. ferritin low 24. asma neg. ama neg. alpha 1 MM. shavonne neg. ast 34, alt 9. hgb low 9.8-need to see gi or pcp. plt 239. monocytes 1200.   Pt with no ascites last year when screened for AAA via u/s. recently in the hospital at [facility]. he was found to have symptomatic anemia-received iv transfusion for this. pt was noted to have hx of etoh abuse. will complete the screens.  due to ascites he was started on lasix and aldactone.  due to varices on coreg as well.   CT triple phase 5/21/21 showed no definitive evidence of HCC, no enhancing liver masses, cirrhotic appearance of the liver with small to moderate intra-abdominal ascites and probable upper abdominal varices.  Esophageal and gastric varices may be present, patent portal veins.   Paracentesis 5/20/21 with 8.2L of fluid removed- path with numerous meseothelial cells and histiocytes, no atypical or malignant cells present.  Colonoscopy 5/20/21- severe left sided diverticulosis, tortuous left colon, 4 mm sessile sigmoid colon polyp removed, small nonbleeding internal hemorrhoids, limited exam beause of suboptimal prep, repeat 3 years.  EGD 5/20/21- 2 cords of small nonbleeding grade 1-2 esophgeal varices in the distal esophagus.    U/S doppler 5/19/21- hepatopetal flow of the liver. Pt here for follow up.  echo showed EF 55-60%  Last labs were may 2021: cr 0.89 tb 0.8 alt 20 alp 134 ast 58 wbc 6.2 hgb 9.8 plt 247

## 2021-12-28 NOTE — EXAM-PHYSICAL EXAM
General: pleasant, well developed, well nourished, no acute distress, non ill appearing Eyes- no scleral icterus HENT: normocephalic, atraumatic head, face mask covering mouth and nose Neck: supple, no JVD Chest: normal breath sounds, normal work of breathing Heart: regular rate and rhythm without murmur Abdomen: soft, non tender, non distended, bowel sounds present, no hepatomegaly, no splenomegaly, no ascites Musculoskeletal: normal gait and station Extremities: no edema, no asterixis, + palmar erythema Skin: no rashes, no jaundice Neurologic: no focal deficits, alert and oriented x 3 Psychiatric: stable mood, appropriate affect

## 2022-01-17 ENCOUNTER — TELEPHONE ENCOUNTER (OUTPATIENT)
Dept: URBAN - METROPOLITAN AREA CLINIC 92 | Facility: CLINIC | Age: 67
End: 2022-01-17

## 2022-01-17 LAB
A/G RATIO: 1.5
AFP, SERUM, TUMOR MARKER: 5.2
ALBUMIN: 4.3
ALKALINE PHOSPHATASE: 121
ALT (SGPT): 34
AST (SGOT): 77
BASO (ABSOLUTE): 0.1
BASOS: 1
BILIRUBIN, TOTAL: 0.7
BUN/CREATININE RATIO: 18
BUN: 12
CALCIUM: 9.4
CARBON DIOXIDE, TOTAL: 25
CHLORIDE: 104
CREATININE: 0.66
EGFR IF AFRICN AM: 116
EGFR IF NONAFRICN AM: 100
EOS (ABSOLUTE): 0.1
EOS: 2
GLOBULIN, TOTAL: 2.8
GLUCOSE: 98
HEMATOCRIT: 37.2
HEMATOLOGY COMMENTS:: (no result)
HEMOGLOBIN: 11.9
IMMATURE CELLS: (no result)
IMMATURE GRANS (ABS): 0
IMMATURE GRANULOCYTES: 0
INR: 1.1
LYMPHS (ABSOLUTE): 1.4
LYMPHS: 21
MCH: 27.8
MCHC: 32
MCV: 87
MONOCYTES(ABSOLUTE): 1
MONOCYTES: 16
NEUTROPHILS (ABSOLUTE): 4
NEUTROPHILS: 60
NRBC: (no result)
PLATELETS: 196
POTASSIUM: 4.3
PROTEIN, TOTAL: 7.1
PROTHROMBIN TIME: 11.3
RBC: 4.28
RDW: 17.6
SODIUM: 141
WBC: 6.6

## 2022-01-17 NOTE — HPI-TODAY'S VISIT:
Labs 1/14/22- afp 5.2 normal, glucose 98 normal, creatinine 0.66 slightly low (prev 0.65), sodium 141 normal, tbili 0.7 normal, alk phos 121 (prev 123), ast 77 high (prev 81), alt 34 normal (prev 23) hemoglobin 11.9 low (prev 10.6), wbc 6.6 normal, platelets 196 normal, rdw 17.6 high and  monocytes 1.0 high (please share with primary md), inr 1.1 normal, MELD 7, MELD-NA 7.

## 2022-01-25 ENCOUNTER — LAB OUTSIDE AN ENCOUNTER (OUTPATIENT)
Dept: URBAN - METROPOLITAN AREA CLINIC 86 | Facility: CLINIC | Age: 67
End: 2022-01-25

## 2022-02-07 ENCOUNTER — TELEPHONE ENCOUNTER (OUTPATIENT)
Dept: URBAN - METROPOLITAN AREA CLINIC 92 | Facility: CLINIC | Age: 67
End: 2022-02-07

## 2022-02-07 NOTE — HPI-TODAY'S VISIT:
Dear Alfredito Geronimo, February 5 MRI read ing sent out to us today. Lower thorax was normal. Liver showed fat deposition by fat fraction of 15 to 20% and desired is less than 6.4%. They do see chronic liver disease changes including lobar redistribution and nodular contour.   Perfusion anomalies seen in the liver which usually lead to a 6-month surveillance for this. Periesophageal, perigastric, and perisplenic varices seen as well as recanalized umbilical vein although but suggestive of portal hypertension is present. Spleen is 13 cm. Pancreas was normal. Kidneys show simple left renal cyst. Colon shows signs of diverticulosis but without inflammation.  Important for you to be on a high-fiber diet with this and make sure that your primary provider is aware of same. Atherosclerosis seen of the aorta without aneurysm.  Please share with primary provider as well. May want to check lipid levels. Degenerative changes seen of the spine. Per last visit, you have a history of the grade 1 and 2 varices that were banded in the past so that is part of your history that was known. July 2021 MRI showed some fat deposition as well as chronic liver disease changes of the lobar redistribution and nodular contour.  They also saw the perfusion changes then.  Trace ascites was seen then which was not seen now. They did not give a fat fraction back then.  Spleen was 13.1 cm. Dr. Diego 

## 2022-02-08 LAB
A/G RATIO: 1.5
ALBUMIN: 4.5
ALKALINE PHOSPHATASE: 134
ALT (SGPT): 18
AST (SGOT): 64
BASO (ABSOLUTE): 0.1
BASOS: 1
BILIRUBIN, TOTAL: 1
BUN/CREATININE RATIO: 20
BUN: 13
CALCIUM: 9
CARBON DIOXIDE, TOTAL: 23
CHLORIDE: 102
CREATININE: 0.64
EGFR IF AFRICN AM: 117
EGFR IF NONAFRICN AM: 101
EOS (ABSOLUTE): 0.1
EOS: 1
GLOBULIN, TOTAL: 3
GLUCOSE: 111
HEMATOCRIT: 40.3
HEMATOLOGY COMMENTS:: (no result)
HEMOGLOBIN: 13.2
IMMATURE CELLS: (no result)
IMMATURE GRANS (ABS): 0
IMMATURE GRANULOCYTES: 0
INR: 1.1
LYMPHS (ABSOLUTE): 1.4
LYMPHS: 20
MCH: 28.6
MCHC: 32.8
MCV: 87
MONOCYTES(ABSOLUTE): 1
MONOCYTES: 15
NEUTROPHILS (ABSOLUTE): 4.5
NEUTROPHILS: 63
NRBC: (no result)
PLATELETS: 135
POTASSIUM: 3.9
PROTEIN, TOTAL: 7.5
PROTHROMBIN TIME: 11.9
RBC: 4.62
RDW: 17.8
SODIUM: 140
WBC: 7.1

## 2022-02-12 ENCOUNTER — TELEPHONE ENCOUNTER (OUTPATIENT)
Dept: URBAN - METROPOLITAN AREA CLINIC 92 | Facility: CLINIC | Age: 67
End: 2022-02-12

## 2022-02-12 NOTE — HPI-TODAY'S VISIT:
labs 2/7/22-  wbc 7.1 normal, hgb 13.2 normal, platelets 135 low (down from 196 prev), rdw 17.8 high and monocytes 1.0 slightly high (please share with primary md), glucose 111 elevated, creatinine 0.64 slightly low, alk phos 134 high (prev 121), ast 64 high (improved from previous of 77), alt 18 normal (improved from prev of 34), total bilirubin 1.0 normal, sodium 140 normal, inr 1.1 normal, MELD 7, MELD-NA 7. Discharged

## 2022-02-15 ENCOUNTER — OFFICE VISIT (OUTPATIENT)
Dept: URBAN - METROPOLITAN AREA CLINIC 86 | Facility: CLINIC | Age: 67
End: 2022-02-15
Payer: MEDICARE

## 2022-02-15 VITALS
HEART RATE: 85 BPM | SYSTOLIC BLOOD PRESSURE: 118 MMHG | HEIGHT: 60 IN | WEIGHT: 209.2 LBS | DIASTOLIC BLOOD PRESSURE: 69 MMHG | TEMPERATURE: 97.3 F | BODY MASS INDEX: 41.07 KG/M2

## 2022-02-15 DIAGNOSIS — K74.60 UNSPECIFIED CIRRHOSIS OF LIVER: ICD-10-CM

## 2022-02-15 DIAGNOSIS — F10.10 ALCOHOL ABUSE: ICD-10-CM

## 2022-02-15 DIAGNOSIS — R18.8 OTHER ASCITES: ICD-10-CM

## 2022-02-15 DIAGNOSIS — Z71.89 VACCINE COUNSELING: ICD-10-CM

## 2022-02-15 DIAGNOSIS — I85.00 ESOPHAGEAL VARICES DETERMINED BY ENDOSCOPY: ICD-10-CM

## 2022-02-15 DIAGNOSIS — R74.8 ABNORMAL LIVER ENZYMES: ICD-10-CM

## 2022-02-15 DIAGNOSIS — E66.01 MORBID (SEVERE) OBESITY DUE TO EXCESS CALORIES: ICD-10-CM

## 2022-02-15 DIAGNOSIS — K76.0 FATTY LIVER: ICD-10-CM

## 2022-02-15 DIAGNOSIS — Z79.899 HIGH RISK MEDICATION USE: ICD-10-CM

## 2022-02-15 PROCEDURE — 99214 OFFICE O/P EST MOD 30 MIN: CPT | Performed by: PHYSICIAN ASSISTANT

## 2022-02-15 RX ORDER — FERROUS SULFATE TAB EC 325 MG (65 MG FE EQUIVALENT) 325 (65 FE) MG
1 TABLET TABLET DELAYED RESPONSE ORAL ONCE A DAY
Status: ACTIVE | COMMUNITY

## 2022-02-15 RX ORDER — APIXABAN 2.5 MG/1
AS DIRECTED TABLET, FILM COATED ORAL
Status: DISCONTINUED | COMMUNITY

## 2022-02-15 RX ORDER — CARVEDILOL 3.12 MG/1
1 TABLET WITH FOOD TABLET, FILM COATED ORAL TWICE A DAY
Status: ON HOLD | COMMUNITY

## 2022-02-15 RX ORDER — SPIRONOLACTONE 25 MG/1
1/2 TABLET TABLET, FILM COATED ORAL ONCE A DAY
Status: ACTIVE | COMMUNITY

## 2022-02-15 RX ORDER — METRONIDAZOLE 7.5 MG/G
1 APPLICATION CREAM TOPICAL TWICE A DAY
Status: ACTIVE | COMMUNITY

## 2022-02-15 RX ORDER — LEVOTHYROXINE SODIUM 125 UG/1
1 TABLET IN THE MORNING ON AN EMPTY STOMACH TABLET ORAL ONCE A DAY
Status: ACTIVE | COMMUNITY

## 2022-02-15 RX ORDER — FUROSEMIDE 20 MG/1
1 TABLET TABLET ORAL ONCE A DAY
Status: ACTIVE | COMMUNITY

## 2022-02-15 RX ORDER — FOLIC ACID 0.8 MG
1 TABLET TABLET ORAL ONCE A DAY
Status: ACTIVE | COMMUNITY

## 2022-02-15 RX ORDER — NADOLOL 20 MG/1
1 TABLET TABLET ORAL ONCE A DAY
Status: ACTIVE | COMMUNITY

## 2022-02-15 NOTE — HPI-TODAY'S VISIT:
Patient is  a 67 yo white male who presents for follow up of cirrhosis with ascites due to fatty liver and alcohol, last seen 12/28/21.  He was referred by Dr. Boregs.   A copy of this note will be sent to the referring provider.  Pt returns for 6 week follow up .  MELD stable at 7 on 2/7/22 labs.  MRI 2/5/22 with fat quant of 15-20%, perfusion anomalies, chronic liver changes with signs of portal htn.  Weight is up 3 lbs over past 6 weeks, BMI 43 and needs to work on this given fatty liver.  He is s/p EGD 12/17/21- with dr. borges showing grade II EV, s/p 3 bands in distal esophagus, moderate portal hypertensive gastropathy, no gastric varices, no active bleeding.   On nadolol 20 mg BID and tolerating without bradycardia/hypotension (taking consistently since 12/1/21- briefly stopped in Oct due to hypotension/bradycardia).   Was prev taken off eliquis by cardiologist, has f/u tomorrow.   Will f/u with Dr. Borges for repeat EGD  likely 3-6 months. Last blood transfusion was in Oct.  Weight 206 12/28/21, prev 215 11/2021.  Continuing to drink 1-2 glasses of wine, 3-4 times week, again advised importance of alcohol abstinenance and need for rehab/counseling.   Iron being managed by pcp- taking iron supplement a couple times per week, causes "stomach upset".  Denies signs of GI bleeding, confusion, abdominal pain, jaundice, n/v, change in bowel movements.  No ascites, currently on lasix 20 mg/aldactone 12.5 mg and tolerating well.  labs 2/7/22-  wbc 7.1 normal, hgb 13.2 normal, platelets 135 low (down from 196 prev), rdw 17.8 high and monocytes 1.0 slightly high (please share with primary md), glucose 111 elevated, creatinine 0.64 slightly low, alk phos 134 high (prev 121), ast 64 high (improved from previous of 77), alt 18 normal (improved from prev of 34), total bilirubin 1.0 normal, sodium 140 normal, inr 1.1 normal, MELD 7, MELD-NA 7  February 5, 2022 MRI Lower thorax was normal. Liver showed fat deposition by fat fraction of 15 to 20% and desired is less than 6.4%. They do see chronic liver disease changes including lobar redistribution and nodular contour.   Perfusion anomalies seen in the liver which usually lead to a 6-month surveillance for this. Periesophageal, perigastric, and perisplenic varices seen as well as recanalized umbilical vein although but suggestive of portal hypertension is present. Spleen is 13 cm. Pancreas was normal. Kidneys show simple left renal cyst. Colon shows signs of diverticulosis but without inflammation.  Important for you to be on a high-fiber diet with this and make sure that your primary provider is aware of same. Atherosclerosis seen of the aorta without aneurysm.  Please share with primary provider as well. May want to check lipid levels. Degenerative changes seen of the spine.  RECAP: Labs 1/14/22- afp 5.2 normal, glucose 98 normal, creatinine 0.66 slightly low (prev 0.65), sodium 141 normal, tbili 0.7 normal, alk phos 121 (prev 123), ast 77 high (prev 81), alt 34 normal (prev 23) hemoglobin 11.9 low (prev 10.6), wbc 6.6 normal, platelets 196 normal, rdw 17.6 high and  monocytes 1.0 high (please share with primary md), inr 1.1 normal, MELD 7, MELD-NA 7.  labs 12/6/21 with cardiology office- iron sat 9 low, tibc 519 high, iron serum 45, wbc 8.4 normal, hgb 10.5 low (continue f/u with gi/pcp for anemia eval), platelets 181 normal, rdw 18.4 high, total cholesterol 210 high, trig 104, hdl 42, glucose 103 high, total bili 0.8, alk phos 121 (prev 129), ast 80 high (prev 48), alt 25 (prev 14), creatinine 0.65 low, sodium 138 normal.    He was evaluated at [facility] on 10/7/21-10/11/21 for hgb 6.8 (also on eliquis)- admitted and given 2 units blood, hgb up to 9.1. Not enough fluid to tap per US.  No scopes performed.    He notes 3 days of small volume hematemesis in middle of Sept.  Wasn't eating much at the time.   He noted systolic BP <100 on several occasions since hospitalization.  They stopped carvedilol.   He was told he had fatty liver 2 years ago. states 'stomach getting bigger' in april 2021 timeframe. per pt was drinking more after jan 2021 and then stopped on may 15, 2021 when he was hospitalized.  Has since started drinking again.    Drinking 2 glasses of wine per day at time of Oct 2021 office visit.  Previously drinking 3-4 drinks daily.   Encouraged absolute alcohol cessation and rehab/therapy.   alk phos 127 (prev 129), ast 48 (prev 53), alt 14 (prev 16), MELD 7 on 10/6/21 labs.  Weight 237 11/2020.  205 1/2021 after stomach flu, 225 prior to para on 5/16/21.  Weight 216 in June 2021.  Weight 212 August.  EGD 5/20/21- 2 cords of small nonbleeding grade 1-2 esophgeal varices in the distal esophagus.  Most recent MRI 6/26/21 showing trace ascites (prior moderate on CT in May 2021)- on lasix 20 mg, spironolactone 12.5 mg/day and doing well with this.  Eating less salt but could do better with this.  No HSE, no signs of GI bleeding, no abdominal pain, jaundice, pruritus, fevers, chills, increased abdominal distention.  Was recommended to go to ED after 10/6/21 labs showing hgb 6.8.    labs 10/6/21- wbc 7.2, hgb 6.8 (LL), plt 177 (prev 147), glucose 103, creatinine 0.74, sodium 142, potassium 4.1, tbili 0.8 (prev 1.7), alk phos 127 (prev 129), ast 48 (prev 53), alt 14 (prev 16), inr 1.1.  MELD 7 and MELD NA 7.  labs 8/12/21- sodium 139, potassium 3.8, chloride 103, creatinine 0.77, tbili 0.9, alk phos 111, ast 66, alt 23, hgb 12.2, iron sat 48  labs 8/3/21- glucose 109, tb 1.7 elevated. alp 129 elevated, ast 53 elevated. alt 16. hgb 12.9 low but better. plt [id number]. monocytes 1.2 elevated. inr 1.2. MELD 10.  July 26 MRI- Lower thorax was normal.  Liver showed some fat deposition as well as changes of chronic liver disease including lobar redistribution and nodular contour.  They also see some delayed particular enhancement of the liver which would be compatible with fibrosis. They see perfusion changes throughout the liver but no suspicious lesions.   Typically we redo an MRI in 6 months to follow these perfusion anomalies. They see varices near the esophagus, stomach and spleen area.  Some of the paraesophageal varices are submucosal.  So this is important to follow with egd as you are doing. The gallbladder and biliary tree was normal.   The spleen was 13.1 cm which many would consider slightly enlarged.  Pancreas was normal.  Simple left renal cyst was seen but no size given.  Some nonspecific lymph nodes were seen near the liver. They feel that those lymph nodes are likely reactive. You do have trace ascites so would be very important for you to remain on a low-salt diet and on your diuretics that you are on.  Low-salt diet is really the basis for the ascites control and then the diuretics help out more if you are already on that type of diet. Some mild degenerative changes were seen of the spine. Overall we would recommend repeat the MRI in 6 months. Important to note that on the previous CT from May of this year you had a cirrhotic appearance of liver but moderate intra-abdominal ascites that as well as the probable varices as well. The EGD was done on May 20 of this year and it showed 2 cords of small grade 1-2 esophageal varices that were seen.  hep c negative, iron sat low 7%-follow up with pcp or gi for this. ceruloplasmin 32. ferritin low 24. asma neg. ama neg. alpha 1 MM. shavonne neg. ast 34, alt 9. hgb low 9.8-need to see gi or pcp. plt 239. monocytes 1200.   CT triple phase 5/21/21 showed no definitive evidence of HCC, no enhancing liver masses, cirrhotic appearance of the liver with small to moderate intra-abdominal ascites and probable upper abdominal varices.  Esophageal and gastric varices may be present, patent portal veins.   Paracentesis 5/20/21 with 8.2L of fluid removed- path with numerous meseothelial cells and histiocytes, no atypical or malignant cells present.  Colonoscopy 5/20/21- severe left sided diverticulosis, tortuous left colon, 4 mm sessile sigmoid colon polyp removed, small nonbleeding internal hemorrhoids, limited exam beause of suboptimal prep, repeat 3 years.  EGD 5/20/21- 2 cords of small nonbleeding grade 1-2 esophgeal varices in the distal esophagus.    U/S doppler 5/19/21- hepatopetal flow of the liver. Pt here for follow up.  echo showed EF 55-60%  Last labs were may 2021: cr 0.89 tb 0.8 alt 20 alp 134 ast 58 wbc 6.2 hgb 9.8 plt 247

## 2022-02-25 ENCOUNTER — TELEPHONE ENCOUNTER (OUTPATIENT)
Dept: URBAN - METROPOLITAN AREA CLINIC 25 | Facility: CLINIC | Age: 67
End: 2022-02-25

## 2022-03-01 ENCOUNTER — LAB OUTSIDE AN ENCOUNTER (OUTPATIENT)
Dept: URBAN - METROPOLITAN AREA CLINIC 25 | Facility: CLINIC | Age: 67
End: 2022-03-01

## 2022-04-21 ENCOUNTER — TELEPHONE ENCOUNTER (OUTPATIENT)
Dept: URBAN - METROPOLITAN AREA CLINIC 25 | Facility: CLINIC | Age: 67
End: 2022-04-21

## 2022-04-28 ENCOUNTER — TELEPHONE ENCOUNTER (OUTPATIENT)
Dept: URBAN - METROPOLITAN AREA CLINIC 105 | Facility: CLINIC | Age: 67
End: 2022-04-28

## 2022-04-30 ENCOUNTER — TELEPHONE ENCOUNTER (OUTPATIENT)
Dept: URBAN - METROPOLITAN AREA CLINIC 121 | Facility: CLINIC | Age: 67
End: 2022-04-30

## 2022-04-30 RX ORDER — METOCLOPRAMIDE 10 MG/1
TABLET ORAL
OUTPATIENT
Start: 2018-07-10

## 2022-04-30 RX ORDER — HYDROCHLOROTHIAZIDE 12.5 MG/1
TABLET ORAL
OUTPATIENT
Start: 2013-05-09

## 2022-04-30 RX ORDER — VITAM B12 100 MCG
TAB ORAL
OUTPATIENT
Start: 2018-07-10

## 2022-04-30 RX ORDER — HYDROCHLOROTHIAZIDE 12.5 MG/1
TABLET ORAL
OUTPATIENT
Start: 2013-05-09 | End: 2018-07-10

## 2022-04-30 RX ORDER — ASPIRIN 325 MG/1
3 TIMES A WEEK TABLET ORAL
OUTPATIENT
Start: 2013-05-09 | End: 2018-07-10

## 2022-04-30 RX ORDER — LEVOTHYROXINE SODIUM 125 UG/1
QD TABLET ORAL
OUTPATIENT
Start: 2013-05-09 | End: 2018-07-10

## 2022-04-30 RX ORDER — LISINOPRIL AND HYDROCHLOROTHIAZIDE TABLETS 10; 12.5 MG/1; MG/1
TABLET ORAL
OUTPATIENT
Start: 2018-07-10

## 2022-04-30 RX ORDER — EZETIMIBE 10 MG/1
TABLET ORAL
OUTPATIENT
Start: 2018-07-10 | End: 2018-07-10

## 2022-04-30 RX ORDER — LEVOTHYROXINE SODIUM 125 UG/1
QD TABLET ORAL
OUTPATIENT
Start: 2013-05-09

## 2022-04-30 RX ORDER — LISINOPRIL AND HYDROCHLOROTHIAZIDE TABLETS 10; 12.5 MG/1; MG/1
TABLET ORAL
OUTPATIENT
Start: 2018-07-10 | End: 2018-07-10

## 2022-04-30 RX ORDER — VALSARTAN 320 MG/1
QD TABLET ORAL
OUTPATIENT
Start: 2013-05-09 | End: 2018-07-10

## 2022-04-30 RX ORDER — VITAM B12 100 MCG
TAB ORAL
OUTPATIENT
Start: 2018-07-10 | End: 2018-07-10

## 2022-04-30 RX ORDER — ASPIRIN 325 MG/1
3 TIMES A WEEK TABLET ORAL
OUTPATIENT
Start: 2013-05-09

## 2022-04-30 RX ORDER — VALSARTAN 320 MG/1
QD TABLET ORAL
OUTPATIENT
Start: 2013-05-09

## 2022-04-30 RX ORDER — EZETIMIBE 10 MG/1
TABLET ORAL
OUTPATIENT
Start: 2018-07-10

## 2022-04-30 RX ORDER — METOCLOPRAMIDE 10 MG/1
TABLET ORAL
OUTPATIENT
Start: 2018-07-10 | End: 2018-07-10

## 2022-05-01 ENCOUNTER — TELEPHONE ENCOUNTER (OUTPATIENT)
Dept: URBAN - METROPOLITAN AREA CLINIC 121 | Facility: CLINIC | Age: 67
End: 2022-05-01

## 2022-05-01 RX ORDER — HYDROCHLOROTHIAZIDE 25 MG/1
TABLET ORAL
Status: ACTIVE | COMMUNITY
Start: 2018-07-10

## 2022-05-01 RX ORDER — LEVOTHYROXINE SODIUM 125 UG/1
TABLET ORAL
Status: ACTIVE | COMMUNITY
Start: 2018-07-10

## 2022-05-01 RX ORDER — VALSARTAN 160 MG/1
TABLET ORAL
Status: ACTIVE | COMMUNITY
Start: 2018-07-10

## 2022-05-15 ENCOUNTER — LAB OUTSIDE AN ENCOUNTER (OUTPATIENT)
Dept: URBAN - METROPOLITAN AREA CLINIC 86 | Facility: CLINIC | Age: 67
End: 2022-05-15

## 2022-05-17 ENCOUNTER — OFFICE VISIT (OUTPATIENT)
Dept: URBAN - METROPOLITAN AREA CLINIC 86 | Facility: CLINIC | Age: 67
End: 2022-05-17

## 2022-05-27 ENCOUNTER — OFFICE VISIT (OUTPATIENT)
Dept: URBAN - METROPOLITAN AREA MEDICAL CENTER 8 | Facility: MEDICAL CENTER | Age: 67
End: 2022-05-27
Payer: MEDICARE

## 2022-05-27 DIAGNOSIS — K76.6 CLINICALLY SIGNIFICANT PORTAL HYPERTENSION: ICD-10-CM

## 2022-05-27 DIAGNOSIS — K31.89 ACQUIRED DEFORMITY OF DUODENUM: ICD-10-CM

## 2022-05-27 DIAGNOSIS — I85.10 ESOPH VARICE OTHER DIS: ICD-10-CM

## 2022-05-27 PROCEDURE — 43235 EGD DIAGNOSTIC BRUSH WASH: CPT | Performed by: INTERNAL MEDICINE

## 2022-05-27 RX ORDER — LEVOTHYROXINE SODIUM 125 UG/1
1 TABLET IN THE MORNING ON AN EMPTY STOMACH TABLET ORAL ONCE A DAY
Status: ACTIVE | COMMUNITY

## 2022-05-27 RX ORDER — METRONIDAZOLE 7.5 MG/G
1 APPLICATION CREAM TOPICAL TWICE A DAY
Status: ACTIVE | COMMUNITY

## 2022-05-27 RX ORDER — HYDROCHLOROTHIAZIDE 25 MG/1
TABLET ORAL
Status: ACTIVE | COMMUNITY
Start: 2018-07-10

## 2022-05-27 RX ORDER — FERROUS SULFATE TAB EC 325 MG (65 MG FE EQUIVALENT) 325 (65 FE) MG
1 TABLET TABLET DELAYED RESPONSE ORAL ONCE A DAY
Status: ACTIVE | COMMUNITY

## 2022-05-27 RX ORDER — SPIRONOLACTONE 25 MG/1
1/2 TABLET TABLET, FILM COATED ORAL ONCE A DAY
Status: ACTIVE | COMMUNITY

## 2022-05-27 RX ORDER — LEVOTHYROXINE SODIUM 125 UG/1
TABLET ORAL
Status: ACTIVE | COMMUNITY
Start: 2018-07-10

## 2022-05-27 RX ORDER — VALSARTAN 160 MG/1
TABLET ORAL
Status: ACTIVE | COMMUNITY
Start: 2018-07-10

## 2022-05-27 RX ORDER — NADOLOL 20 MG/1
1 TABLET TABLET ORAL ONCE A DAY
Status: ACTIVE | COMMUNITY

## 2022-05-27 RX ORDER — CARVEDILOL 3.12 MG/1
1 TABLET WITH FOOD TABLET, FILM COATED ORAL TWICE A DAY
Status: ON HOLD | COMMUNITY

## 2022-05-27 RX ORDER — FUROSEMIDE 20 MG/1
1 TABLET TABLET ORAL ONCE A DAY
Status: ACTIVE | COMMUNITY

## 2022-05-27 RX ORDER — FOLIC ACID 0.8 MG
1 TABLET TABLET ORAL ONCE A DAY
Status: ACTIVE | COMMUNITY

## 2022-05-27 NOTE — HPI-TODAY'S VISIT:
Patient is  a 67 yo white male who presents for follow up of cirrhosis with ascites due to fatty liver and alcohol, last seen 12/28/21.  He was referred by Dr. Borges.   A copy of this note will be sent to the referring provider.  Pt returns for 6 week follow up .  MELD stable at 7 on 2/7/22 labs.  MRI 2/5/22 with fat quant of 15-20%, perfusion anomalies, chronic liver changes with signs of portal htn.  Weight is up 3 lbs over past 6 weeks, BMI 43 and needs to work on this given fatty liver.  He is s/p EGD 12/17/21- with dr. borges showing grade II EV, s/p 3 bands in distal esophagus, moderate portal hypertensive gastropathy, no gastric varices, no active bleeding.   On nadolol 20 mg BID and tolerating without bradycardia/hypotension (taking consistently since 12/1/21- briefly stopped in Oct due to hypotension/bradycardia).   Was prev taken off eliquis by cardiologist, has f/u tomorrow.   Will f/u with Dr. Borges for repeat EGD  likely 3-6 months. Last blood transfusion was in Oct.  Weight 206 12/28/21, prev 215 11/2021.  Continuing to drink 1-2 glasses of wine, 3-4 times week, again advised importance of alcohol abstinenance and need for rehab/counseling.   Iron being managed by pcp- taking iron supplement a couple times per week, causes "stomach upset".  Denies signs of GI bleeding, confusion, abdominal pain, jaundice, n/v, change in bowel movements.  No ascites, currently on lasix 20 mg/aldactone 12.5 mg and tolerating well.  labs 2/7/22-  wbc 7.1 normal, hgb 13.2 normal, platelets 135 low (down from 196 prev), rdw 17.8 high and monocytes 1.0 slightly high (please share with primary md), glucose 111 elevated, creatinine 0.64 slightly low, alk phos 134 high (prev 121), ast 64 high (improved from previous of 77), alt 18 normal (improved from prev of 34), total bilirubin 1.0 normal, sodium 140 normal, inr 1.1 normal, MELD 7, MELD-NA 7  February 5, 2022 MRI Lower thorax was normal. Liver showed fat deposition by fat fraction of 15 to 20% and desired is less than 6.4%. They do see chronic liver disease changes including lobar redistribution and nodular contour.   Perfusion anomalies seen in the liver which usually lead to a 6-month surveillance for this. Periesophageal, perigastric, and perisplenic varices seen as well as recanalized umbilical vein although but suggestive of portal hypertension is present. Spleen is 13 cm. Pancreas was normal. Kidneys show simple left renal cyst. Colon shows signs of diverticulosis but without inflammation.  Important for you to be on a high-fiber diet with this and make sure that your primary provider is aware of same. Atherosclerosis seen of the aorta without aneurysm.  Please share with primary provider as well. May want to check lipid levels. Degenerative changes seen of the spine.  RECAP: Labs 1/14/22- afp 5.2 normal, glucose 98 normal, creatinine 0.66 slightly low (prev 0.65), sodium 141 normal, tbili 0.7 normal, alk phos 121 (prev 123), ast 77 high (prev 81), alt 34 normal (prev 23) hemoglobin 11.9 low (prev 10.6), wbc 6.6 normal, platelets 196 normal, rdw 17.6 high and  monocytes 1.0 high (please share with primary md), inr 1.1 normal, MELD 7, MELD-NA 7.  labs 12/6/21 with cardiology office- iron sat 9 low, tibc 519 high, iron serum 45, wbc 8.4 normal, hgb 10.5 low (continue f/u with gi/pcp for anemia eval), platelets 181 normal, rdw 18.4 high, total cholesterol 210 high, trig 104, hdl 42, glucose 103 high, total bili 0.8, alk phos 121 (prev 129), ast 80 high (prev 48), alt 25 (prev 14), creatinine 0.65 low, sodium 138 normal.    He was evaluated at [facility] on 10/7/21-10/11/21 for hgb 6.8 (also on eliquis)- admitted and given 2 units blood, hgb up to 9.1. Not enough fluid to tap per US.  No scopes performed.    He notes 3 days of small volume hematemesis in middle of Sept.  Wasn't eating much at the time.   He noted systolic BP <100 on several occasions since hospitalization.  They stopped carvedilol.   He was told he had fatty liver 2 years ago. states 'stomach getting bigger' in april 2021 timeframe. per pt was drinking more after jan 2021 and then stopped on may 15, 2021 when he was hospitalized.  Has since started drinking again.    Drinking 2 glasses of wine per day at time of Oct 2021 office visit.  Previously drinking 3-4 drinks daily.   Encouraged absolute alcohol cessation and rehab/therapy.   alk phos 127 (prev 129), ast 48 (prev 53), alt 14 (prev 16), MELD 7 on 10/6/21 labs.  Weight 237 11/2020.  205 1/2021 after stomach flu, 225 prior to para on 5/16/21.  Weight 216 in June 2021.  Weight 212 August.  EGD 5/20/21- 2 cords of small nonbleeding grade 1-2 esophgeal varices in the distal esophagus.  Most recent MRI 6/26/21 showing trace ascites (prior moderate on CT in May 2021)- on lasix 20 mg, spironolactone 12.5 mg/day and doing well with this.  Eating less salt but could do better with this.  No HSE, no signs of GI bleeding, no abdominal pain, jaundice, pruritus, fevers, chills, increased abdominal distention.  Was recommended to go to ED after 10/6/21 labs showing hgb 6.8.    labs 10/6/21- wbc 7.2, hgb 6.8 (LL), plt 177 (prev 147), glucose 103, creatinine 0.74, sodium 142, potassium 4.1, tbili 0.8 (prev 1.7), alk phos 127 (prev 129), ast 48 (prev 53), alt 14 (prev 16), inr 1.1.  MELD 7 and MELD NA 7.  labs 8/12/21- sodium 139, potassium 3.8, chloride 103, creatinine 0.77, tbili 0.9, alk phos 111, ast 66, alt 23, hgb 12.2, iron sat 48  labs 8/3/21- glucose 109, tb 1.7 elevated. alp 129 elevated, ast 53 elevated. alt 16. hgb 12.9 low but better. plt [id number]. monocytes 1.2 elevated. inr 1.2. MELD 10.  July 26 MRI- Lower thorax was normal.  Liver showed some fat deposition as well as changes of chronic liver disease including lobar redistribution and nodular contour.  They also see some delayed particular enhancement of the liver which would be compatible with fibrosis. They see perfusion changes throughout the liver but no suspicious lesions.   Typically we redo an MRI in 6 months to follow these perfusion anomalies. They see varices near the esophagus, stomach and spleen area.  Some of the paraesophageal varices are submucosal.  So this is important to follow with egd as you are doing. The gallbladder and biliary tree was normal.   The spleen was 13.1 cm which many would consider slightly enlarged.  Pancreas was normal.  Simple left renal cyst was seen but no size given.  Some nonspecific lymph nodes were seen near the liver. They feel that those lymph nodes are likely reactive. You do have trace ascites so would be very important for you to remain on a low-salt diet and on your diuretics that you are on.  Low-salt diet is really the basis for the ascites control and then the diuretics help out more if you are already on that type of diet. Some mild degenerative changes were seen of the spine. Overall we would recommend repeat the MRI in 6 months. Important to note that on the previous CT from May of this year you had a cirrhotic appearance of liver but moderate intra-abdominal ascites that as well as the probable varices as well. The EGD was done on May 20 of this year and it showed 2 cords of small grade 1-2 esophageal varices that were seen.  hep c negative, iron sat low 7%-follow up with pcp or gi for this. ceruloplasmin 32. ferritin low 24. asma neg. ama neg. alpha 1 MM. shavonne neg. ast 34, alt 9. hgb low 9.8-need to see gi or pcp. plt 239. monocytes 1200.   CT triple phase 5/21/21 showed no definitive evidence of HCC, no enhancing liver masses, cirrhotic appearance of the liver with small to moderate intra-abdominal ascites and probable upper abdominal varices.  Esophageal and gastric varices may be present, patent portal veins.   Paracentesis 5/20/21 with 8.2L of fluid removed- path with numerous meseothelial cells and histiocytes, no atypical or malignant cells present.  Colonoscopy 5/20/21- severe left sided diverticulosis, tortuous left colon, 4 mm sessile sigmoid colon polyp removed, small nonbleeding internal hemorrhoids, limited exam beause of suboptimal prep, repeat 3 years.  EGD 5/20/21- 2 cords of small nonbleeding grade 1-2 esophgeal varices in the distal esophagus.    U/S doppler 5/19/21- hepatopetal flow of the liver. Pt here for follow up.  echo showed EF 55-60%  Last labs were may 2021: cr 0.89 tb 0.8 alt 20 alp 134 ast 58 wbc 6.2 hgb 9.8 plt 247

## 2022-06-02 ENCOUNTER — TELEPHONE ENCOUNTER (OUTPATIENT)
Dept: URBAN - METROPOLITAN AREA CLINIC 92 | Facility: CLINIC | Age: 67
End: 2022-06-02

## 2022-06-02 NOTE — HPI-TODAY'S VISIT:
Dear Alfredito Geronimo, May 27 egd sent by Dr Borges. Grade 1 esophageal varices seen in middle third esophagus and lower third of esophagis. Mild portal hypertensive gastropathy seen in stomach. No varices in stomach. Duodenum normal. Good to see no banding needed. Dr Diego

## 2022-06-16 ENCOUNTER — OFFICE VISIT (OUTPATIENT)
Dept: URBAN - METROPOLITAN AREA CLINIC 86 | Facility: CLINIC | Age: 67
End: 2022-06-16
Payer: MEDICARE

## 2022-06-16 VITALS
WEIGHT: 222 LBS | DIASTOLIC BLOOD PRESSURE: 64 MMHG | TEMPERATURE: 97.4 F | SYSTOLIC BLOOD PRESSURE: 117 MMHG | HEIGHT: 60 IN | BODY MASS INDEX: 43.59 KG/M2 | HEART RATE: 67 BPM

## 2022-06-16 DIAGNOSIS — K76.89 LIVER LESION: ICD-10-CM

## 2022-06-16 DIAGNOSIS — Z79.899 HIGH RISK MEDICATION USE: ICD-10-CM

## 2022-06-16 DIAGNOSIS — R93.2 ABNORMAL LIVER DIAGNOSTIC IMAGING: ICD-10-CM

## 2022-06-16 DIAGNOSIS — I85.00 ESOPHAGEAL VARICES DETERMINED BY ENDOSCOPY: ICD-10-CM

## 2022-06-16 DIAGNOSIS — K74.60 UNSPECIFIED CIRRHOSIS OF LIVER: ICD-10-CM

## 2022-06-16 DIAGNOSIS — Z71.89 VACCINE COUNSELING: ICD-10-CM

## 2022-06-16 DIAGNOSIS — F10.20 ALCOHOL DEPENDENCE: ICD-10-CM

## 2022-06-16 DIAGNOSIS — E66.01 MORBID (SEVERE) OBESITY DUE TO EXCESS CALORIES: ICD-10-CM

## 2022-06-16 DIAGNOSIS — K76.0 FATTY LIVER: ICD-10-CM

## 2022-06-16 DIAGNOSIS — R18.8 OTHER ASCITES: ICD-10-CM

## 2022-06-16 DIAGNOSIS — R74.8 ABNORMAL LIVER ENZYMES: ICD-10-CM

## 2022-06-16 PROCEDURE — 99215 OFFICE O/P EST HI 40 MIN: CPT

## 2022-06-16 RX ORDER — HYDROCHLOROTHIAZIDE 25 MG/1
TABLET ORAL
Status: ACTIVE | COMMUNITY
Start: 2018-07-10

## 2022-06-16 RX ORDER — CARVEDILOL 3.12 MG/1
1 TABLET WITH FOOD TABLET, FILM COATED ORAL TWICE A DAY
Status: DISCONTINUED | COMMUNITY

## 2022-06-16 RX ORDER — SPIRONOLACTONE 25 MG/1
1/2 TABLET TABLET, FILM COATED ORAL ONCE A DAY
Status: ACTIVE | COMMUNITY

## 2022-06-16 RX ORDER — FERROUS SULFATE TAB EC 325 MG (65 MG FE EQUIVALENT) 325 (65 FE) MG
1 TABLET TABLET DELAYED RESPONSE ORAL ONCE A DAY
Status: ON HOLD | COMMUNITY

## 2022-06-16 RX ORDER — METRONIDAZOLE 7.5 MG/G
1 APPLICATION CREAM TOPICAL TWICE A DAY
Status: ACTIVE | COMMUNITY

## 2022-06-16 RX ORDER — FUROSEMIDE 20 MG/1
1 TABLET TABLET ORAL ONCE A DAY
Status: ACTIVE | COMMUNITY

## 2022-06-16 RX ORDER — NADOLOL 20 MG/1
1 TABLET TABLET ORAL ONCE A DAY
Status: ACTIVE | COMMUNITY

## 2022-06-16 RX ORDER — VALSARTAN 160 MG/1
1 TABLET TABLET ORAL ONCE A DAY
Status: DISCONTINUED | COMMUNITY
Start: 2018-07-10

## 2022-06-16 RX ORDER — OMEGA-3/DHA/EPA/FISH OIL 120-180 MG
1 TABLET CAPSULE ORAL ONCE A DAY
Status: ACTIVE | COMMUNITY

## 2022-06-16 RX ORDER — LEVOTHYROXINE SODIUM 125 UG/1
1 TABLET IN THE MORNING ON AN EMPTY STOMACH TABLET ORAL ONCE A DAY
Status: ACTIVE | COMMUNITY

## 2022-06-16 RX ORDER — FOLIC ACID 0.8 MG
1 TABLET TABLET ORAL ONCE A DAY
Status: ACTIVE | COMMUNITY

## 2022-06-16 RX ORDER — THIAMINE HCL 100 MG
1 TABLET TABLET ORAL ONCE A DAY
Status: ACTIVE | COMMUNITY

## 2022-06-16 NOTE — EXAM-PHYSICAL EXAM
Gen: awake and responsive. Eyes: anicteric, normal lids. Mouth: covered with mask. Nose: covered with mask. Hearing: intact grossly. Neck: trachea midline and no jvd. CV: RRR no s3. Lungs: clear. No wheezes, Abd: Soft, nabs, nr, NT, obese. Liver left lobe enlarged and indurated and spleen trace palpable. Ext: no sig edema, some palm erythema. Neuro: moves all 4 ext grossly. No asterixis. Skin: no pruritis and some palm erythema. Legs dry lower extremities.

## 2022-06-16 NOTE — HPI-TODAY'S VISIT:
Patient is  a 67 yo white male who presents for follow up of cirrhosis with ascites due to fatty liver and alcohol, last seen Feb 2022.    He was originally referred by Dr. Borges and a copy will be sent to the referring.  May 27 egd sent by Dr Borges. Grade 1 esophageal varices seen in middle third esophagus and lower third of esophagis. Mild portal hypertensive gastropathy seen in stomach. No varices in stomach. Duodenum normal. Good to see no banding needed. Plan for redo in 1 yr.  Local doctor did labs and she did them and not recall and told stable.  Feb 2022 visit with Yanely:  MELD stable at 7 on 2/7/22 labs.    MRI 2/5/22 with fat quant of 15-20%, perfusion anomalies, chronic liver changes with signs of portal htn.   Says weight is up alittle. Prior BMI 43 and needs to work on this given fatty liver and says heart team cleared to exercise more.   s/p EGD 12/17/21- with dr. borges showing grade II EV, s/p 3 bands in distal esophagus, moderate portal hypertensive gastropathy, no gastric varices, no active bleeding.     On nadolol 20 mg BID and tolerating without bradycardia/hypotension (taking consistently since 12/1/21- briefly stopped in Oct due to hypotension/bradycardia).     Was prev taken off eliquis by cardiologist, and was only on it for 2 weeks ablation.   Last blood transfusion was in Oct.    Weight 206 12/28/21, prev 215 11/2021.    Feb 2022 he was continuing to drink 1-2 glasses of wine, 3-4 times week and he was advised again  re   importance of alcohol abstinenance and need for rehab/counseling.     Iron being managed by pcp and off that and that was normal now. Prior was taking iron supplement a couple times per week, causes "stomach upset".    No ascites, currently on lasix 20 mg/aldactone 12.5 mg and tolerating well.  2/7/22-  wbc 7.1 normal, hgb 13.2 normal, platelets 135 low (down from 196 prev), rdw 17.8 high and monocytes 1.0 slightly high (please share with primary md), glucose 111 elevated, creatinine 0.64 slightly low, alk phos 134 high (prev 121), ast 64 high (improved from previous of 77), alt 18 normal (improved from prev of 34), total bilirubin 1.0 normal, sodium 140 normal, inr 1.1 normal, MELD 7, MELD-NA 7  February 5, 2022 MRI Lower thorax was normal. Liver showed fat deposition by fat fraction of 15 to 20% and desired is less than 6.4%. They do see chronic liver disease changes including lobar redistribution and nodular contour.   Perfusion anomalies seen in the liver which usually lead to a 6-month surveillance for this. Periesophageal, perigastric, and perisplenic varices seen as well as recanalized umbilical vein although but suggestive of portal hypertension is present. Spleen is 13 cm. Pancreas was normal. Kidneys show simple left renal cyst. Colon shows signs of diverticulosis but without inflammation.  Important for you to be on a high-fiber diet with this and make sure that your primary provider is aware of same. Atherosclerosis seen of the aorta without aneurysm.  Please share with primary provider as well. May want to check lipid levels. Degenerative changes seen of the spine.  Labs 1/14/22- afp 5.2 normal, glucose 98 normal, creatinine 0.66 slightly low (prev 0.65), sodium 141 normal, tbili 0.7 normal, alk phos 121 (prev 123), ast 77 high (prev 81), alt 34 normal (prev 23) hemoglobin 11.9 low (prev 10.6), wbc 6.6 normal, platelets 196 normal, rdw 17.6 high and  monocytes 1.0 high (please share with primary md), inr 1.1 normal, MELD 7, MELD-NA 7.  labs 12/6/21 with cardiology office- iron sat 9 low, tibc 519 high, iron serum 45, wbc 8.4 normal, hgb 10.5 low (continue f/u with gi/pcp for anemia eval), platelets 181 normal, rdw 18.4 high, total cholesterol 210 high, trig 104, hdl 42, glucose 103 high, total bili 0.8, alk phos 121 (prev 129), ast 80 high (prev 48), alt 25 (prev 14), creatinine 0.65 low, sodium 138 normal.    He was evaluated at [facility] on 10/7/21-10/11/21 for hgb 6.8 (also on eliquis)- admitted and given 2 units blood, hgb up to 9.1. Not enough fluid to tap per US.  No scopes performed.    He notes 3 days of small volume hematemesis in middle of Sept.  Wasn't eating much at the time.   He noted systolic BP <100 on several occasions since hospitalization.  They stopped carvedilol.   He was told he had fatty liver 2 years ago. states 'stomach getting bigger' in april 2021 timeframe. per pt was drinking more after jan 2021 and then stopped on may 15, 2021 when he was hospitalized.  Has since started drinking again.    Drinking 2 glasses of wine per day at time of Oct 2021 office visit.  Previously drinking 3-4 drinks daily.   Encouraged absolute alcohol cessation and rehab/therapy.   alk phos 127 (prev 129), ast 48 (prev 53), alt 14 (prev 16), MELD 7 on 10/6/21 labs.  Weight 237 11/2020.  205 1/2021 after stomach flu, 225 prior to para on 5/16/21.  Weight 216 in June 2021.  Weight 212 August.  EGD 5/20/21- 2 cords of small nonbleeding grade 1-2 esophgeal varices in the distal esophagus.  Most recent MRI 6/26/21 showing trace ascites (prior moderate on CT in May 2021)- on lasix 20 mg, spironolactone 12.5 mg/day and doing well with this.  Eating less salt but could do better with this.  No HSE, no signs of GI bleeding, no abdominal pain, jaundice, pruritus, fevers, chills, increased abdominal distention.  Was recommended to go to ED after 10/6/21 labs showing hgb 6.8.    labs 10/6/21- wbc 7.2, hgb 6.8 (LL), plt 177 (prev 147), glucose 103, creatinine 0.74, sodium 142, potassium 4.1, tbili 0.8 (prev 1.7), alk phos 127 (prev 129), ast 48 (prev 53), alt 14 (prev 16), inr 1.1.  MELD 7 and MELD NA 7.  labs 8/12/21- sodium 139, potassium 3.8, chloride 103, creatinine 0.77, tbili 0.9, alk phos 111, ast 66, alt 23, hgb 12.2, iron sat 48  labs 8/3/21- glucose 109, tb 1.7 elevated. alp 129 elevated, ast 53 elevated. alt 16. hgb 12.9 low but better. plt [id number]. monocytes 1.2 elevated. inr 1.2. MELD 10.  July 26 MRI- Lower thorax was normal.  Liver showed some fat deposition as well as changes of chronic liver disease including lobar redistribution and nodular contour.  They also see some delayed particular enhancement of the liver which would be compatible with fibrosis. They see perfusion changes throughout the liver but no suspicious lesions.   Typically we redo an MRI in 6 months to follow these perfusion anomalies. They see varices near the esophagus, stomach and spleen area.  Some of the paraesophageal varices are submucosal.  So this is important to follow with egd as you are doing. The gallbladder and biliary tree was normal.   The spleen was 13.1 cm which many would consider slightly enlarged.  Pancreas was normal.  Simple left renal cyst was seen but no size given.  Some nonspecific lymph nodes were seen near the liver. They feel that those lymph nodes are likely reactive. You do have trace ascites so would be very important for you to remain on a low-salt diet and on your diuretics that you are on.  Low-salt diet is really the basis for the ascites control and then the diuretics help out more if you are already on that type of diet. Some mild degenerative changes were seen of the spine. Overall we would recommend repeat the MRI in 6 months. Important to note that on the previous CT from May of this year you had a cirrhotic appearance of liver but moderate intra-abdominal ascites that as well as the probable varices as well. The EGD was done on May 20 of this year and it showed 2 cords of small grade 1-2 esophageal varices that were seen.  hep c negative, iron sat low 7%-follow up with pcp or gi for this. ceruloplasmin 32. ferritin low 24. asma neg. ama neg. alpha 1 MM. shavonne neg. ast 34, alt 9. hgb low 9.8-need to see gi or pcp. plt 239. monocytes 1200.   CT triple phase 5/21/21 showed no definitive evidence of HCC, no enhancing liver masses, cirrhotic appearance of the liver with small to moderate intra-abdominal ascites and probable upper abdominal varices.  Esophageal and gastric varices may be present, patent portal veins.   Paracentesis 5/20/21 with 8.2L of fluid removed- path with numerous meseothelial cells and histiocytes, no atypical or malignant cells present.  Colonoscopy 5/20/21- severe left sided diverticulosis, tortuous left colon, 4 mm sessile sigmoid colon polyp removed, small nonbleeding internal hemorrhoids, limited exam beause of suboptimal prep, repeat 3 years.  EGD 5/20/21- 2 cords of small nonbleeding grade 1-2 esophgeal varices in the distal esophagus.    U/S doppler 5/19/21- hepatopetal flow of the liver. Pt here for follow up.  echo showed EF 55-60%  Last labs were may 2021: cr 0.89 tb 0.8 alt 20 alp 134 ast 58 wbc 6.2 hgb 9.8 plt 247   Plan: 1. Pt will have labs that he did sent in. 2. Plan for labs in Aug and do the mri perfusion changes in august. 3. Pt reminded re issue of not drinking.  Stressed to pt the need for social distancing and strict handwashing and wearing a mask and to follow any other new or added CDC recommendations as this is an evolving target.  Duration of the visit ws 45 min with 10 min of chart prep reviewing data and information that was available for the visit and setting up in ecw and then an additional 35 min for the face to face visit today with time spent reviewing said material and their clinical correlates and then with this information being used to formulate a treatment plan.

## 2022-07-15 ENCOUNTER — TELEPHONE ENCOUNTER (OUTPATIENT)
Dept: URBAN - METROPOLITAN AREA CLINIC 92 | Facility: CLINIC | Age: 67
End: 2022-07-15

## 2022-07-15 ENCOUNTER — LAB OUTSIDE AN ENCOUNTER (OUTPATIENT)
Dept: URBAN - METROPOLITAN AREA CLINIC 92 | Facility: CLINIC | Age: 67
End: 2022-07-15

## 2022-07-15 NOTE — HPI-TODAY'S VISIT:
stephen, call pt. [place] notified insurance denied mri so jovanna do u.s and as expect if they cannot see perfusion changes can then ask for mri then.

## 2022-07-29 ENCOUNTER — TELEPHONE ENCOUNTER (OUTPATIENT)
Dept: URBAN - METROPOLITAN AREA CLINIC 118 | Facility: CLINIC | Age: 67
End: 2022-07-29

## 2022-08-01 ENCOUNTER — LAB OUTSIDE AN ENCOUNTER (OUTPATIENT)
Dept: URBAN - METROPOLITAN AREA CLINIC 86 | Facility: CLINIC | Age: 67
End: 2022-08-01

## 2022-08-10 ENCOUNTER — TELEPHONE ENCOUNTER (OUTPATIENT)
Dept: URBAN - METROPOLITAN AREA CLINIC 92 | Facility: CLINIC | Age: 67
End: 2022-08-10

## 2022-08-10 NOTE — HPI-TODAY'S VISIT:
Dear Alfredito Geronimo, Aug 10 ultrasound shows liver with increased echogenicity and no lesions. No dilated bile ducts seen. Gallbladder decompressed with mild wall thickening.  Gracia sign is negative. Pancreas visualized portions unremarkable. Right kidney and left kidney 13 cm with no hydronephrosis. Spleen slightly enlarged at 13.8 cm. Liver vessels were not well evaluated due to the increased echogenicity. Trace ascites was seen. Overall the liver appeared to be fatty with the hepatic vasculature is not well seen due to poor penetration.  Splenomegaly was noted.  Trace ascites was seen.  Important to stay on a low-salt diet. I believe that we may be able to use this ultrasound as a justification to do a more advanced scan such as an MRI next time. Your February 2022 MRI did not show any concerning lesions but did have the perfusion anomalies and I believe this scan was done as the MRI was declined.  Given the limitations seen of the ultrasound we will be able to then order the MRI for the next scan. Dr Diego

## 2022-10-14 ENCOUNTER — TELEPHONE ENCOUNTER (OUTPATIENT)
Dept: URBAN - METROPOLITAN AREA CLINIC 92 | Facility: CLINIC | Age: 67
End: 2022-10-14

## 2022-11-01 ENCOUNTER — LAB OUTSIDE AN ENCOUNTER (OUTPATIENT)
Dept: URBAN - METROPOLITAN AREA CLINIC 86 | Facility: CLINIC | Age: 67
End: 2022-11-01

## 2022-12-12 ENCOUNTER — WEB ENCOUNTER (OUTPATIENT)
Dept: URBAN - METROPOLITAN AREA CLINIC 86 | Facility: CLINIC | Age: 67
End: 2022-12-12

## 2022-12-12 ENCOUNTER — LAB OUTSIDE AN ENCOUNTER (OUTPATIENT)
Dept: URBAN - METROPOLITAN AREA CLINIC 86 | Facility: CLINIC | Age: 67
End: 2022-12-12

## 2022-12-12 ENCOUNTER — OFFICE VISIT (OUTPATIENT)
Dept: URBAN - METROPOLITAN AREA CLINIC 86 | Facility: CLINIC | Age: 67
End: 2022-12-12
Payer: MEDICARE

## 2022-12-12 VITALS
SYSTOLIC BLOOD PRESSURE: 113 MMHG | HEIGHT: 60 IN | DIASTOLIC BLOOD PRESSURE: 57 MMHG | WEIGHT: 208 LBS | HEART RATE: 70 BPM | BODY MASS INDEX: 40.84 KG/M2 | TEMPERATURE: 97.2 F

## 2022-12-12 DIAGNOSIS — R74.8 ABNORMAL LIVER ENZYMES: ICD-10-CM

## 2022-12-12 DIAGNOSIS — Z71.89 VACCINE COUNSELING: ICD-10-CM

## 2022-12-12 DIAGNOSIS — K74.60 UNSPECIFIED CIRRHOSIS OF LIVER: ICD-10-CM

## 2022-12-12 DIAGNOSIS — I85.00 ESOPHAGEAL VARICES DETERMINED BY ENDOSCOPY: ICD-10-CM

## 2022-12-12 DIAGNOSIS — F10.20 ALCOHOL DEPENDENCE: ICD-10-CM

## 2022-12-12 DIAGNOSIS — K76.0 FATTY LIVER: ICD-10-CM

## 2022-12-12 DIAGNOSIS — Z79.899 HIGH RISK MEDICATION USE: ICD-10-CM

## 2022-12-12 DIAGNOSIS — E66.01 MORBID (SEVERE) OBESITY DUE TO EXCESS CALORIES: ICD-10-CM

## 2022-12-12 DIAGNOSIS — K76.89 LIVER LESION: ICD-10-CM

## 2022-12-12 DIAGNOSIS — R18.8 OTHER ASCITES: ICD-10-CM

## 2022-12-12 DIAGNOSIS — R93.2 ABNORMAL LIVER DIAGNOSTIC IMAGING: ICD-10-CM

## 2022-12-12 PROCEDURE — 99215 OFFICE O/P EST HI 40 MIN: CPT

## 2022-12-12 RX ORDER — HYDROCHLOROTHIAZIDE 25 MG/1
TABLET ORAL
Status: DISCONTINUED | COMMUNITY
Start: 2018-07-10

## 2022-12-12 RX ORDER — THIAMINE HCL 100 MG
1 TABLET TABLET ORAL ONCE A DAY
Status: ACTIVE | COMMUNITY

## 2022-12-12 RX ORDER — OMEGA-3/DHA/EPA/FISH OIL 120-180 MG
1 TABLET CAPSULE ORAL ONCE A DAY
Status: ACTIVE | COMMUNITY

## 2022-12-12 RX ORDER — METRONIDAZOLE 7.5 MG/G
1 APPLICATION CREAM TOPICAL TWICE A DAY
Status: ACTIVE | COMMUNITY

## 2022-12-12 RX ORDER — SPIRONOLACTONE 25 MG/1
1/2 TABLET TABLET, FILM COATED ORAL ONCE A DAY
Status: ACTIVE | COMMUNITY

## 2022-12-12 RX ORDER — NADOLOL 20 MG/1
1 TABLET TABLET ORAL BID
Status: ACTIVE | COMMUNITY

## 2022-12-12 RX ORDER — FOLIC ACID 0.8 MG
1 TABLET TABLET ORAL ONCE A DAY
Status: ACTIVE | COMMUNITY

## 2022-12-12 RX ORDER — FUROSEMIDE 20 MG/1
1 TABLET TABLET ORAL ONCE A DAY
Status: ACTIVE | COMMUNITY

## 2022-12-12 RX ORDER — LEVOTHYROXINE SODIUM 125 UG/1
1 TABLET IN THE MORNING ON AN EMPTY STOMACH TABLET ORAL ONCE A DAY
Status: ACTIVE | COMMUNITY

## 2022-12-12 RX ORDER — FERROUS SULFATE TAB EC 325 MG (65 MG FE EQUIVALENT) 325 (65 FE) MG
1 TABLET TABLET DELAYED RESPONSE ORAL ONCE A DAY
Status: DISCONTINUED | COMMUNITY

## 2022-12-12 NOTE — HPI-TODAY'S VISIT:
Patient is  a 68 yo white male who presents for follow up of cirrhosis with ascites due to fatty liver and alcohol, last seen June 2022.    He was originally referred by Dr. Borges and a copy will be sent to the referring.  Prior mri was declined.  No new labs.  Aug 10 ultrasound shows liver with increased echogenicity and no lesions. No dilated bile ducts seen. Gallbladder decompressed with mild wall thickening.  Gracia sign is negative. Pancreas visualized portions unremarkable. Right kidney and left kidney 13 cm with no hydronephrosis. Spleen slightly enlarged at 13.8 cm. Liver vessels were not well evaluated due to the increased echogenicity. Trace ascites was seen. Overall the liver appeared to be fatty with the hepatic vasculature is not well seen due to poor penetration.  Splenomegaly was noted.  Trace ascites was seen.  Important to stay on a low-salt diet. I believe that we may be able to use this ultrasound as a justification to do a more advanced scan such as an MRI next time.  Your February 2022 MRI did not show any concerning lesions but did have the perfusion anomalies and I believe this scan was done as the MRI was declined.  Given the limitations seen of the ultrasound we will be able to then order the MRI for the next scan.  [place] notified insurance denied mri so jovanna do u.s and as expect if they cannot see perfusion changes can then ask for mri then.   May 27 egd sent by Dr Borges. Grade 1 esophageal varices seen in middle third esophagus and lower third of esophagis. Mild portal hypertensive gastropathy seen in stomach. No varices in stomach. Duodenum normal. Good to see no banding needed. Plan for redo in 1 yr.  Local doctor inc diuretics for possible fluid for 1m. Curiously the us only trace ascites.  Feb 2022 visit with Yanely:  MELD stable at 7 on 2/7/22 labs.    MRI 2/5/22 with fat quant of 15-20%, perfusion anomalies, chronic liver changes with signs of portal htn.   Says weight is down a little. Prior BMI 43 and needs to work on this given fatty liver and says heart team cleared to exercise more. He says 3 weeks ago at primary 8 pounds.  s/p EGD 12/17/21- with dr. borges showing grade II EV, s/p 3 bands in distal esophagus, moderate portal hypertensive gastropathy, no gastric varices, no active bleeding.     On nadolol 20 mg BID and tolerating without bradycardia/hypotension and tolerated.  Was prev taken off eliquis by cardiologist, and was only on it for 2 weeks ablation.   Last blood transfusion was in Oct. 2021.  Weight 206 12/28/21, prev 215 11/2021. Dec 2022 was 207.5.    Feb 2022 he was continuing to drink 1-2 glasses of wine, 3-4 times week and he was advised again  re   importance of alcohol abstinenance and need for rehab/counseling.   He is still consuming alcohol.   Iron being managed by pcp and off that and that was normal now. Prior was taking iron supplement a couple times per week, causes "stomach upset".    Currently on lasix 20 mg/aldactone 12.5 mg and tolerating well.  2/7/22-  wbc 7.1 normal, hgb 13.2 normal, platelets 135 low (down from 196 prev), rdw 17.8 high and monocytes 1.0 slightly high (please share with primary md), glucose 111 elevated, creatinine 0.64 slightly low, alk phos 134 high (prev 121), ast 64 high (improved from previous of 77), alt 18 normal (improved from prev of 34), total bilirubin 1.0 normal, sodium 140 normal, inr 1.1 normal, MELD 7, MELD-NA 7  February 5, 2022 MRI Lower thorax was normal. Liver showed fat deposition by fat fraction of 15 to 20% and desired is less than 6.4%. They do see chronic liver disease changes including lobar redistribution and nodular contour.   Perfusion anomalies seen in the liver which usually lead to a 6-month surveillance for this. Periesophageal, perigastric, and perisplenic varices seen as well as recanalized umbilical vein although but suggestive of portal hypertension is present. Spleen is 13 cm. Pancreas was normal. Kidneys show simple left renal cyst. Colon shows signs of diverticulosis but without inflammation.  Important for you to be on a high-fiber diet with this and make sure that your primary provider is aware of same. Atherosclerosis seen of the aorta without aneurysm.  Please share with primary provider as well. May want to check lipid levels. Degenerative changes seen of the spine.  Labs 1/14/22- afp 5.2 normal, glucose 98 normal, creatinine 0.66 slightly low (prev 0.65), sodium 141 normal, tbili 0.7 normal, alk phos 121 (prev 123), ast 77 high (prev 81), alt 34 normal (prev 23) hemoglobin 11.9 low (prev 10.6), wbc 6.6 normal, platelets 196 normal, rdw 17.6 high and  monocytes 1.0 high (please share with primary md), inr 1.1 normal, MELD 7, MELD-NA 7.  labs 12/6/21 with cardiology office- iron sat 9 low, tibc 519 high, iron serum 45, wbc 8.4 normal, hgb 10.5 low (continue f/u with gi/pcp for anemia eval), platelets 181 normal, rdw 18.4 high, total cholesterol 210 high, trig 104, hdl 42, glucose 103 high, total bili 0.8, alk phos 121 (prev 129), ast 80 high (prev 48), alt 25 (prev 14), creatinine 0.65 low, sodium 138 normal.    He was evaluated at [facility] on 10/7/21-10/11/21 for hgb 6.8 (also on eliquis)- admitted and given 2 units blood, hgb up to 9.1. Not enough fluid to tap per US.  No scopes performed.    He notes 3 days of small volume hematemesis in middle of Sept.  Wasn't eating much at the time.   He noted systolic BP <100 on several occasions since hospitalization.  They stopped carvedilol.   He was told he had fatty liver 2 years ago. states 'stomach getting bigger' in april 2021 timeframe. per pt was drinking more after jan 2021 and then stopped on may 15, 2021 when he was hospitalized.  Has since started drinking again.    Drinking 2 glasses of wine per day at time of Oct 2021 office visit.  Previously drinking 3-4 drinks daily.   Encouraged absolute alcohol cessation and rehab/therapy.   alk phos 127 (prev 129), ast 48 (prev 53), alt 14 (prev 16), MELD 7 on 10/6/21 labs.  Weight 237 11/2020.  205 1/2021 after stomach flu, 225 prior to para on 5/16/21.  Weight 216 in June 2021.  Weight 212 August.  EGD 5/20/21- 2 cords of small nonbleeding grade 1-2 esophgeal varices in the distal esophagus.  Most recent MRI 6/26/21 showing trace ascites (prior moderate on CT in May 2021)- on lasix 20 mg, spironolactone 12.5 mg/day and doing well with this.  Eating less salt but could do better with this.  No HSE, no signs of GI bleeding, no abdominal pain, jaundice, pruritus, fevers, chills, increased abdominal distention.  Was recommended to go to ED after 10/6/21 labs showing hgb 6.8.    labs 10/6/21- wbc 7.2, hgb 6.8 (LL), plt 177 (prev 147), glucose 103, creatinine 0.74, sodium 142, potassium 4.1, tbili 0.8 (prev 1.7), alk phos 127 (prev 129), ast 48 (prev 53), alt 14 (prev 16), inr 1.1.  MELD 7 and MELD NA 7.  labs 8/12/21- sodium 139, potassium 3.8, chloride 103, creatinine 0.77, tbili 0.9, alk phos 111, ast 66, alt 23, hgb 12.2, iron sat 48  labs 8/3/21- glucose 109, tb 1.7 elevated. alp 129 elevated, ast 53 elevated. alt 16. hgb 12.9 low but better. plt [id number]. monocytes 1.2 elevated. inr 1.2. MELD 10.  July 26 MRI- Lower thorax was normal.  Liver showed some fat deposition as well as changes of chronic liver disease including lobar redistribution and nodular contour.  They also see some delayed particular enhancement of the liver which would be compatible with fibrosis. They see perfusion changes throughout the liver but no suspicious lesions.   Typically we redo an MRI in 6 months to follow these perfusion anomalies. They see varices near the esophagus, stomach and spleen area.  Some of the paraesophageal varices are submucosal.  So this is important to follow with egd as you are doing. The gallbladder and biliary tree was normal.   The spleen was 13.1 cm which many would consider slightly enlarged.  Pancreas was normal.  Simple left renal cyst was seen but no size given.  Some nonspecific lymph nodes were seen near the liver. They feel that those lymph nodes are likely reactive. You do have trace ascites so would be very important for you to remain on a low-salt diet and on your diuretics that you are on.  Low-salt diet is really the basis for the ascites control and then the diuretics help out more if you are already on that type of diet. Some mild degenerative changes were seen of the spine. Overall we would recommend repeat the MRI in 6 months. Important to note that on the previous CT from May of this year you had a cirrhotic appearance of liver but moderate intra-abdominal ascites that as well as the probable varices as well. The EGD was done on May 20 of this year and it showed 2 cords of small grade 1-2 esophageal varices that were seen.  hep c negative, iron sat low 7%-follow up with pcp or gi for this. ceruloplasmin 32. ferritin low 24. asma neg. ama neg. alpha 1 MM. shavonne neg. ast 34, alt 9. hgb low 9.8-need to see gi or pcp. plt 239. monocytes 1200.   CT triple phase 5/21/21 showed no definitive evidence of HCC, no enhancing liver masses, cirrhotic appearance of the liver with small to moderate intra-abdominal ascites and probable upper abdominal varices.  Esophageal and gastric varices may be present, patent portal veins.   Paracentesis 5/20/21 with 8.2L of fluid removed- path with numerous meseothelial cells and histiocytes, no atypical or malignant cells present.  Colonoscopy 5/20/21- severe left sided diverticulosis, tortuous left colon, 4 mm sessile sigmoid colon polyp removed, small nonbleeding internal hemorrhoids, limited exam beause of suboptimal prep, repeat 3 years.  EGD 5/20/21- 2 cords of small nonbleeding grade 1-2 esophgeal varices in the distal esophagus.    U/S doppler 5/19/21- hepatopetal flow of the liver. Pt here for follow up.  echo showed EF 55-60%  Last labs were may 2021: cr 0.89 tb 0.8 alt 20 alp 134 ast 58 wbc 6.2 hgb 9.8 plt 247   Plan: 1. Pt will ahve mri ordered to appeal for the perfusion changes and the limited u.s that was done and see if we can do now. Last one in Feb.  2. Needs to work on weight about the same as last year. 3. Need to work on the alcohol as not stopped and that will preclude. 4. Pt will be planned for a visit in april and we will do labs now and in 4m, 5. Next may doing egd with Dr Borges.   Stressed to pt the need for social distancing and strict handwashing and wearing a mask and to follow any other new or added CDC recommendations as this is an evolving target.  Duration of the visit was 42 min with 10 min of chart prep reviewing data and information that was available for the visit and setting up in ecw and then an additional 32 min for the face to face visit today with time spent reviewing said material and their clinical correlates and then with this information being used to formulate a treatment plan.

## 2022-12-12 NOTE — EXAM-PHYSICAL EXAM
Gen: awake and responsive. Eyes: anicteric, normal lids. Mouth: covered with mask. Nose: covered with mask. Hearing: intact grossly. Neck: trachea midline and no jvd. CV: RRR no s3. Lungs: clear. No wheezes, Abd: Soft, nabs, nr, NT, obese. Liver left lobe enlarged and indurated and spleen trace palpable. No fluid wave. Ext: no sig edema, some palm erythema. Neuro: moves all 4 ext grossly. No asterixis. Skin: no pruritis and some palm erythema.

## 2022-12-13 ENCOUNTER — TELEPHONE ENCOUNTER (OUTPATIENT)
Dept: URBAN - METROPOLITAN AREA CLINIC 92 | Facility: CLINIC | Age: 67
End: 2022-12-13

## 2022-12-13 PROBLEM — 15167005: Status: ACTIVE | Noted: 2021-09-01

## 2022-12-13 LAB
A/G RATIO: 1
ABSOLUTE BASOPHILS: 50
ABSOLUTE EOSINOPHILS: 88
ABSOLUTE LYMPHOCYTES: 838
ABSOLUTE MONOCYTES: 788
ABSOLUTE NEUTROPHILS: 4536
ALBUMIN: 3.9
ALKALINE PHOSPHATASE: 174
ALT (SGPT): 33
AST (SGOT): 159
BASOPHILS: 0.8
BILIRUBIN, TOTAL: 2
BUN/CREATININE RATIO: 20
BUN: 13
CALCIUM: 9.3
CARBON DIOXIDE, TOTAL: 25
CHLORIDE: 100
CREATININE: 0.64
EGFR: 103
EOSINOPHILS: 1.4
GLOBULIN, TOTAL: 4.1
GLUCOSE: 112
HEMATOCRIT: 37.1
HEMOGLOBIN: 13
HEP A AB, IGM: (no result)
HEPATITIS A AB, TOTAL: REACTIVE
HEPATITIS B SURFACE AB IMMUNITY, QN: 631
INR: 1.3
LYMPHOCYTES: 13.3
MCH: 33.9
MCHC: 35
MCV: 96.6
MONOCYTES: 12.5
MPV: 12.1
NEUTROPHILS: 72
PLATELET COUNT: 98
POTASSIUM: 4.1
PROTEIN, TOTAL: 8
PT: 13
RDW: 16.1
RED BLOOD CELL COUNT: 3.84
SODIUM: 138
WHITE BLOOD CELL COUNT: 6.3

## 2022-12-13 NOTE — HPI-TODAY'S VISIT:
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  Good to see it is present and important to note that its come down a little bit from last year and it was 665.9 to the current 631. Hepatitis A IgM was negative and hep A immunity was seen so this would indicate that both hep a and B vaccines are still providing immunity for you. Your INR was up to 1.3 from previous 1.1 back in February.   Glucose was about the same and 112 from previous 111 in February BUN was 13 creatinine 0.64 sodium 138 potassium 4.1 albumin was 3.9. Your total globulin was slightly up at 4.1. Your AST was higher at 159 from 64 which suggests that your liver is more inflamed and your ALT is likewise higher at 33 from 18 and you need to work on the continued alcohol use issues as we discussed.   Your alkaline phosphatase was 174 also higher at 134. Your total bilirubin doubled from 1.0 to 2.0 and some concern about these labs and they are increased.  You really need to work on this issue. White blood cell count was 6.3 hemoglobin 13 platelet count was 98 and previously your plate count was 135,000 so that has gone down.  Your hemoglobin also has come down from 13.2 to 13. RBC count was diminished at 3.84 and your MCV was 96.6.  RDW was elevated at 16.1.  Please share these labs with your primary provider. Neutrophils were 4536 and lymphocytes were slightly low at 838.  These were previously normal back in February. Meld 12 and meld na 12. Prior had been around a 7 so that went up. Am concerned with the rising bili and labs and once see the tb start to rise with this,  it could go into a much higher crisis level and that needs to be followed for. I would recommend that you redo the labs in a month and do an in person or telemed visit here to see if the labs are better.  Please work on this issues as you can clearly see the labs are worse. Dr Diego

## 2023-01-09 ENCOUNTER — LAB OUTSIDE AN ENCOUNTER (OUTPATIENT)
Dept: URBAN - METROPOLITAN AREA CLINIC 92 | Facility: CLINIC | Age: 68
End: 2023-01-09

## 2023-01-24 ENCOUNTER — LAB OUTSIDE AN ENCOUNTER (OUTPATIENT)
Dept: URBAN - METROPOLITAN AREA CLINIC 86 | Facility: CLINIC | Age: 68
End: 2023-01-24

## 2023-01-27 ENCOUNTER — TELEPHONE ENCOUNTER (OUTPATIENT)
Dept: URBAN - METROPOLITAN AREA CLINIC 92 | Facility: CLINIC | Age: 68
End: 2023-01-27

## 2023-01-30 ENCOUNTER — OFFICE VISIT (OUTPATIENT)
Dept: URBAN - METROPOLITAN AREA CLINIC 86 | Facility: CLINIC | Age: 68
End: 2023-01-30
Payer: MEDICARE

## 2023-01-30 VITALS
BODY MASS INDEX: 38.09 KG/M2 | SYSTOLIC BLOOD PRESSURE: 117 MMHG | HEART RATE: 60 BPM | DIASTOLIC BLOOD PRESSURE: 62 MMHG | WEIGHT: 194 LBS | HEIGHT: 60 IN | TEMPERATURE: 97.5 F

## 2023-01-30 DIAGNOSIS — K74.60 UNSPECIFIED CIRRHOSIS OF LIVER: ICD-10-CM

## 2023-01-30 DIAGNOSIS — F10.20 ALCOHOL DEPENDENCE: ICD-10-CM

## 2023-01-30 DIAGNOSIS — K74.69 CIRRHOSIS, CRYPTOGENIC: ICD-10-CM

## 2023-01-30 DIAGNOSIS — R74.8 ABNORMAL LIVER ENZYMES: ICD-10-CM

## 2023-01-30 DIAGNOSIS — K76.0 FATTY LIVER: ICD-10-CM

## 2023-01-30 PROBLEM — 300331000 LESION OF LIVER: Status: ACTIVE | Noted: 2022-06-16

## 2023-01-30 PROBLEM — 19943007: Status: ACTIVE | Noted: 2021-06-03

## 2023-01-30 PROBLEM — 442684004 IMAGING OF LIVER ABNORMAL: Status: ACTIVE | Noted: 2022-06-16

## 2023-01-30 PROBLEM — 197321007: Status: ACTIVE | Noted: 2021-09-01

## 2023-01-30 PROBLEM — 166643006 LIVER ENZYMES ABNORMAL: Status: ACTIVE | Noted: 2021-09-01

## 2023-01-30 PROBLEM — 408512008: Status: ACTIVE | Noted: 2021-09-01

## 2023-01-30 PROBLEM — 28670008: Status: ACTIVE | Noted: 2021-06-03

## 2023-01-30 PROBLEM — 389026000: Status: ACTIVE | Noted: 2021-06-03

## 2023-01-30 PROBLEM — 83911000119104: Status: ACTIVE | Noted: 2021-09-01

## 2023-01-30 PROBLEM — 453861000124107: Status: ACTIVE | Noted: 2021-09-01

## 2023-01-30 PROBLEM — 66590003 ALCOHOL DEPENDENCE: Status: ACTIVE | Noted: 2022-06-16

## 2023-01-30 PROBLEM — 409063005 COUNSELING: Status: ACTIVE | Noted: 2021-09-01

## 2023-01-30 PROCEDURE — 99214 OFFICE O/P EST MOD 30 MIN: CPT | Performed by: PHYSICIAN ASSISTANT

## 2023-01-30 RX ORDER — NADOLOL 20 MG/1
1 TABLET TABLET ORAL BID
Status: ACTIVE | COMMUNITY

## 2023-01-30 RX ORDER — FUROSEMIDE 20 MG/1
1 TABLET TABLET ORAL ONCE A DAY
Status: ACTIVE | COMMUNITY

## 2023-01-30 RX ORDER — METRONIDAZOLE 7.5 MG/G
1 APPLICATION CREAM TOPICAL TWICE A DAY
Status: ACTIVE | COMMUNITY

## 2023-01-30 RX ORDER — LEVOTHYROXINE SODIUM 125 UG/1
1 TABLET IN THE MORNING ON AN EMPTY STOMACH TABLET ORAL ONCE A DAY
Status: ACTIVE | COMMUNITY

## 2023-01-30 RX ORDER — FOLIC ACID 0.8 MG
1 TABLET TABLET ORAL ONCE A DAY
Status: ACTIVE | COMMUNITY

## 2023-01-30 RX ORDER — SPIRONOLACTONE 25 MG/1
1/2 TABLET TABLET, FILM COATED ORAL ONCE A DAY
Status: ACTIVE | COMMUNITY

## 2023-01-30 RX ORDER — THIAMINE HCL 100 MG
1 TABLET TABLET ORAL ONCE A DAY
Status: ACTIVE | COMMUNITY

## 2023-01-30 RX ORDER — OMEGA-3/DHA/EPA/FISH OIL 120-180 MG
1 TABLET CAPSULE ORAL ONCE A DAY
Status: ACTIVE | COMMUNITY

## 2023-01-30 NOTE — HPI-TODAY'S VISIT:
Patient is  a 68 yo white male who presents for follow up of cirrhosis with ascites due to fatty liver and alcohol, last seen June 2022.    He was originally referred by Dr. Borges and a copy will be sent to the referring.  1/30/23 December 12 labs showed that your hep B surface antibody strongly positive at 631.  Good to see it is present and important to note that its come down a little bit from last year and it was 665.9 to the current 631. Hepatitis A IgM was negative and hep A immunity was seen so this would indicate that both hep a and B vaccines are still providing immunity for you. Your INR was up to 1.3 from previous 1.1 back in February.   Glucose was about the same and 112 from previous 111 in February BUN was 13 creatinine 0.64 sodium 138 potassium 4.1 albumin was 3.9. Your total globulin was slightly up at 4.1. Your AST was higher at 159 from 64 which suggests that your liver is more inflamed and your ALT is likewise higher at 33 from 18 and you need to work on the continued alcohol use issues as we discussed.   Your alkaline phosphatase was 174 also higher at 134. Your total bilirubin doubled from 1.0 to 2.0 and some concern about these labs and they are increased.  You really need to work on this issue. White blood cell count was 6.3 hemoglobin 13 platelet count was 98 and previously your plate count was 135,000 so that has gone down.  Your hemoglobin also has come down from 13.2 to 13. RBC count was diminished at 3.84 and your MCV was 96.6.  RDW was elevated at 16.1.  Please share these labs with your primary provider. Neutrophils were 4536 and lymphocytes were slightly low at 838.  These were previously normal back in February. Meld 12 and meld na 12. Prior had been around a 7 so that went up. Am concerned with the rising bili and labs and once see the tb start to rise with this,  it could go into a much higher crisis level and that needs to be followed for. I would recommend that you redo the labs in a month and do an in person or telemed visit here to see if the labs are better.  Please work on this issues as you can clearly see the labs are worse.  1/24/23 labs: cbc : rbc low  mcv 99, platelets 88,  glucose 168, cr 0.76, bilirubin 2.2, alk phos 125, ast 62, alt 18 1.4 MELD 13 peth pending Has reduced etoh and went 1 week in early january and none since 19th on jan discussed the bilirubin and has had a cold and could have impacted it   Patient will be out of the country feb 11-march 2 due to vacation viking cruise and he will do this. Discussed diet while on the cruise and encouraged him on the weight loss prior he was 208 and now 194 and he will stay off the etoh and work on diet and we will monitor this  EGD due in may   recap Aug 10 ultrasound shows liver with increased echogenicity and no lesions. No dilated bile ducts seen. Gallbladder decompressed with mild wall thickening.  Gracia sign is negative. Pancreas visualized portions unremarkable. Right kidney and left kidney 13 cm with no hydronephrosis. Spleen slightly enlarged at 13.8 cm. Liver vessels were not well evaluated due to the increased echogenicity. Trace ascites was seen. Overall the liver appeared to be fatty with the hepatic vasculature is not well seen due to poor penetration.  Splenomegaly was noted.  Trace ascites was seen.  Important to stay on a low-salt diet. I believe that we may be able to use this ultrasound as a justification to do a more advanced scan such as an MRI next time.  Your February 2022 MRI did not show any concerning lesions but did have the perfusion anomalies and I believe this scan was done as the MRI was declined.  Given the limitations seen of the ultrasound we will be able to then order the MRI for the next scan.  [place] notified insurance denied mri so jovanna do u.s and as expect if they cannot see perfusion changes can then ask for mri then.   May 27 egd sent by Dr Borges. Grade 1 esophageal varices seen in middle third esophagus and lower third of esophagis. Mild portal hypertensive gastropathy seen in stomach. No varices in stomach. Duodenum normal. Good to see no banding needed. Plan for redo in 1 yr.  Says weight is down a little. Prior BMI 43 and needs to work on this given fatty liver and says heart team cleared to exercise more. He says 3 weeks ago at primary 8 pounds.  s/p EGD 12/17/21- with dr. borges showing grade II EV, s/p 3 bands in distal esophagus, moderate portal hypertensive gastropathy, no gastric varices, no active bleeding.     February 5, 2022 MRI Lower thorax was normal. Liver showed fat deposition by fat fraction of 15 to 20% and desired is less than 6.4%. They do see chronic liver disease changes including lobar redistribution and nodular contour.   Perfusion anomalies seen in the liver which usually lead to a 6-month surveillance for this. Periesophageal, perigastric, and perisplenic varices seen as well as recanalized umbilical vein although but suggestive of portal hypertension is present. Spleen is 13 cm. Pancreas was normal. Kidneys show simple left renal cyst. Colon shows signs of diverticulosis but without inflammation.  Important for you to be on a high-fiber diet with this and make sure that your primary provider is aware of same. Atherosclerosis seen of the aorta without aneurysm.  Please share with primary provider as well. May want to check lipid levels. Degenerative changes seen of the spine.  Labs 1/14/22- afp 5.2 normal, glucose 98 normal, creatinine 0.66 slightly low (prev 0.65), sodium 141 normal, tbili 0.7 normal, alk phos 121 (prev 123), ast 77 high (prev 81), alt 34 normal (prev 23) hemoglobin 11.9 low (prev 10.6), wbc 6.6 normal, platelets 196 normal, rdw 17.6 high and  monocytes 1.0 high (please share with primary md), inr 1.1 normal, MELD 7, MELD-NA 7.  He was evaluated at [facility] on 10/7/21-10/11/21 for hgb 6.8 (also on eliquis)- admitted and given 2 units blood, hgb up to 9.1. Not enough fluid to tap per US.  No scopes performed.    He notes 3 days of small volume hematemesis in middle of Sept.  Wasn't eating much at the time.   He noted systolic BP <100 on several occasions since hospitalization.  They stopped carvedilol.   He was told he had fatty liver 2 years ago. states 'stomach getting bigger' in april 2021 timeframe. per pt was drinking more after jan 2021 and then stopped on may 15, 2021 when he was hospitalized.  Has since started drinking again.    Drinking 2 glasses of wine per day at time of Oct 2021 office visit.  Previously drinking 3-4 drinks daily.   Encouraged absolute alcohol cessation and rehab/therapy.   alk phos 127 (prev 129), ast 48 (prev 53), alt 14 (prev 16), MELD 7 on 10/6/21 labs.  Weight 237 11/2020.  205 1/2021 after stomach flu, 225 prior to para on 5/16/21.  Weight 216 in June 2021.  Weight 212 August.  EGD 5/20/21- 2 cords of small nonbleeding grade 1-2 esophgeal varices in the distal esophagus.  Most recent MRI 6/26/21 showing trace ascites (prior moderate on CT in May 2021)- on lasix 20 mg, spironolactone 12.5 mg/day and doing well with this.  Eating less salt but could do better with this.  No HSE, no signs of GI bleeding, no abdominal pain, jaundice, pruritus, fevers, chills, increased abdominal distention.  Was recommended to go to ED after 10/6/21 labs showing hgb 6.8.    July 26 MRI- Lower thorax was normal.  Liver showed some fat deposition as well as changes of chronic liver disease including lobar redistribution and nodular contour.  They also see some delayed particular enhancement of the liver which would be compatible with fibrosis. They see perfusion changes throughout the liver but no suspicious lesions.   Typically we redo an MRI in 6 months to follow these perfusion anomalies. They see varices near the esophagus, stomach and spleen area.  Some of the paraesophageal varices are submucosal.  So this is important to follow with egd as you are doing. The gallbladder and biliary tree was normal.   The spleen was 13.1 cm which many would consider slightly enlarged.  Pancreas was normal.  Simple left renal cyst was seen but no size given.  Some nonspecific lymph nodes were seen near the liver. They feel that those lymph nodes are likely reactive. You do have trace ascites so would be very important for you to remain on a low-salt diet and on your diuretics that you are on.  Low-salt diet is really the basis for the ascites control and then the diuretics help out more if you are already on that type of diet. Some mild degenerative changes were seen of the spine. Overall we would recommend repeat the MRI in 6 months. Important to note that on the previous CT from May of this year you had a cirrhotic appearance of liver but moderate intra-abdominal ascites that as well as the probable varices as well. The EGD was done on May 20 of this year and it showed 2 cords of small grade 1-2 esophageal varices that were seen.  hep c negative, iron sat low 7%-follow up with pcp or gi for this. ceruloplasmin 32. ferritin low 24. asma neg. ama neg. alpha 1 MM. shavonne neg. ast 34, alt 9. hgb low 9.8-need to see gi or pcp. plt 239. monocytes 1200.   CT triple phase 5/21/21 showed no definitive evidence of HCC, no enhancing liver masses, cirrhotic appearance of the liver with small to moderate intra-abdominal ascites and probable upper abdominal varices.  Esophageal and gastric varices may be present, patent portal veins.   Paracentesis 5/20/21 with 8.2L of fluid removed- path with numerous meseothelial cells and histiocytes, no atypical or malignant cells present.  Colonoscopy 5/20/21- severe left sided diverticulosis, tortuous left colon, 4 mm sessile sigmoid colon polyp removed, small nonbleeding internal hemorrhoids, limited exam beause of suboptimal prep, repeat 3 years.  EGD 5/20/21- 2 cords of small nonbleeding grade 1-2 esophgeal varices in the distal esophagus.    U/S doppler 5/19/21- hepatopetal flow of the liver. Pt here for follow up.  echo showed EF 55-60%

## 2023-02-01 LAB
A/G RATIO: 1.3
ALBUMIN: 4
ALKALINE PHOSPHATASE: 125
ALT (SGPT): 18
AST (SGOT): 62
BASO (ABSOLUTE): 0
BASOS: 1
BILIRUBIN, TOTAL: 2.2
BUN/CREATININE RATIO: 20
BUN: 15
CALCIUM: 9.1
CARBON DIOXIDE, TOTAL: 23
CHLORIDE: 102
CREATININE: 0.76
EGFR: 98
EOS (ABSOLUTE): 0.1
EOS: 2
GLOBULIN, TOTAL: 3
GLUCOSE: 168
HEMATOCRIT: 40.4
HEMATOLOGY COMMENTS:: (no result)
HEMOGLOBIN: 13.8
IMMATURE CELLS: (no result)
IMMATURE GRANS (ABS): 0
IMMATURE GRANULOCYTES: 0
INR: 1.4
LYMPHS (ABSOLUTE): 0.9
LYMPHS: 21
MCH: 33.9
MCHC: 34.2
MCV: 99
MONOCYTES(ABSOLUTE): 0.4
MONOCYTES: 9
NEUTROPHILS (ABSOLUTE): 3.1
NEUTROPHILS: 67
NRBC: (no result)
PHOSPHATIDYLETHANOL (PETH): 225
PLATELETS: 88
POTASSIUM: 4.6
PROTEIN, TOTAL: 7
PROTHROMBIN TIME: 14
RBC: 4.07
RDW: 14
SODIUM: 139
WBC: 4.6

## 2023-02-06 ENCOUNTER — LAB OUTSIDE AN ENCOUNTER (OUTPATIENT)
Dept: URBAN - METROPOLITAN AREA CLINIC 86 | Facility: CLINIC | Age: 68
End: 2023-02-06

## 2023-02-08 LAB
A/G RATIO: 1.4
ALBUMIN: 4.2
ALKALINE PHOSPHATASE: 168
ALT (SGPT): 27
AST (SGOT): 95
BASO (ABSOLUTE): 0.1
BASOS: 1
BILIRUBIN, TOTAL: 2.2
BUN/CREATININE RATIO: 17
BUN: 11
CALCIUM: 9.4
CARBON DIOXIDE, TOTAL: 24
CHLORIDE: 103
CREATININE: 0.64
EGFR: 103
EOS (ABSOLUTE): 0.1
EOS: 1
GLOBULIN, TOTAL: 3.1
GLUCOSE: 122
HEMATOCRIT: 38.8
HEMATOLOGY COMMENTS:: (no result)
HEMOGLOBIN: 13.7
IMMATURE CELLS: (no result)
IMMATURE GRANS (ABS): 0
IMMATURE GRANULOCYTES: 0
INR: 1.3
LYMPHS (ABSOLUTE): 0.8
LYMPHS: 15
MCH: 34.3
MCHC: 35.3
MCV: 97
MONOCYTES(ABSOLUTE): 0.7
MONOCYTES: 13
NEUTROPHILS (ABSOLUTE): 4
NEUTROPHILS: 70
NRBC: (no result)
PLATELETS: 86
POTASSIUM: 4.5
PROTEIN, TOTAL: 7.3
PROTHROMBIN TIME: 13.4
RBC: 3.99
RDW: 14.2
SODIUM: 139
WBC: 5.7

## 2023-02-09 ENCOUNTER — TELEPHONE ENCOUNTER (OUTPATIENT)
Dept: URBAN - METROPOLITAN AREA CLINIC 86 | Facility: CLINIC | Age: 68
End: 2023-02-09

## 2023-02-09 NOTE — HPI-SCREENING
Spoke with the patient about his labs today 2/6/23 labs with rbc low, he will share this with his pcp. THe bilirubin as not improve and the alp, ast, and alt all back up and prior tehy were imprroving as he had stopped the alcohol and now back up and when asked he had been drinking again. He will be out of the country for 20 days and leaving tomorrow. Recommend he check labs while away. He has ordes to do when he gets back. Discussed the risk of continued ETOH use and er precautions

## 2023-03-07 ENCOUNTER — LAB OUTSIDE AN ENCOUNTER (OUTPATIENT)
Dept: URBAN - METROPOLITAN AREA CLINIC 86 | Facility: CLINIC | Age: 68
End: 2023-03-07

## 2023-03-20 ENCOUNTER — TELEPHONE ENCOUNTER (OUTPATIENT)
Dept: URBAN - METROPOLITAN AREA CLINIC 86 | Facility: CLINIC | Age: 68
End: 2023-03-20

## 2023-03-20 LAB
A/G RATIO: 1.3
ALBUMIN: 4.3
ALKALINE PHOSPHATASE: 150
ALT (SGPT): 17
AST (SGOT): 70
BASO (ABSOLUTE): 0.1
BASOS: 1
BILIRUBIN, TOTAL: 2.3
BUN/CREATININE RATIO: 16
BUN: 11
CALCIUM: 8.8
CARBON DIOXIDE, TOTAL: 25
CHLORIDE: 101
CREATININE: 0.69
EGFR: 101
EOS (ABSOLUTE): 0.1
EOS: 2
GLOBULIN, TOTAL: 3.2
GLUCOSE: 117
HEMATOCRIT: 36.5
HEMATOLOGY COMMENTS:: (no result)
HEMOGLOBIN: 13.1
IMMATURE CELLS: (no result)
IMMATURE GRANS (ABS): 0
IMMATURE GRANULOCYTES: 0
INR: 1.4
LYMPHS (ABSOLUTE): 1.5
LYMPHS: 26
MCH: 34.9
MCHC: 35.9
MCV: 97
MONOCYTES(ABSOLUTE): 0.8
MONOCYTES: 14
NEUTROPHILS (ABSOLUTE): 3.4
NEUTROPHILS: 57
NRBC: (no result)
PLATELETS: 77
POTASSIUM: 3.9
PROTEIN, TOTAL: 7.5
PROTHROMBIN TIME: 13.9
RBC: 3.75
RDW: 13.1
SODIUM: 142
WBC: 5.9

## 2023-03-20 NOTE — HPI-TODAY'S VISIT:
Dear Alfredito Geronimo,  The March 17 labs were sent to me.  The white blood cells 5.9 red blood cells 3.75 and this is low please share this with your primary care.  MCV 97, platelets 77 and this is low.  The glucose 117, creatinine 0.69, sodium 142, potassium 3.9, bilirubin 2.3, alkaline phosphatase 150, AST 70, ALT 17.  Previously the bilirubin was 2.2,  Alkaline phosphatase was 168, AST 95 and ALT 27.  Some of these have improved.  INR is 1.4.  We will discuss these labs at your next visit.  Flor Layton PA-C

## 2023-03-27 ENCOUNTER — OFFICE VISIT (OUTPATIENT)
Dept: URBAN - METROPOLITAN AREA CLINIC 86 | Facility: CLINIC | Age: 68
End: 2023-03-27
Payer: MEDICARE

## 2023-03-27 VITALS
WEIGHT: 195 LBS | DIASTOLIC BLOOD PRESSURE: 58 MMHG | HEART RATE: 58 BPM | SYSTOLIC BLOOD PRESSURE: 104 MMHG | BODY MASS INDEX: 29.55 KG/M2 | TEMPERATURE: 97.2 F | HEIGHT: 68 IN

## 2023-03-27 DIAGNOSIS — K74.60 UNSPECIFIED CIRRHOSIS OF LIVER: ICD-10-CM

## 2023-03-27 DIAGNOSIS — K76.0 FATTY LIVER: ICD-10-CM

## 2023-03-27 DIAGNOSIS — K76.89 LIVER LESION: ICD-10-CM

## 2023-03-27 DIAGNOSIS — R18.8 OTHER ASCITES: ICD-10-CM

## 2023-03-27 PROCEDURE — 99214 OFFICE O/P EST MOD 30 MIN: CPT | Performed by: PHYSICIAN ASSISTANT

## 2023-03-27 RX ORDER — FUROSEMIDE 20 MG/1
1 TABLET TABLET ORAL ONCE A DAY
Status: ACTIVE | COMMUNITY

## 2023-03-27 RX ORDER — THIAMINE HCL 100 MG
1 TABLET TABLET ORAL ONCE A DAY
Status: ACTIVE | COMMUNITY

## 2023-03-27 RX ORDER — FOLIC ACID 0.8 MG
1 TABLET TABLET ORAL ONCE A DAY
Status: ACTIVE | COMMUNITY

## 2023-03-27 RX ORDER — OMEGA-3/DHA/EPA/FISH OIL 120-180 MG
1 TABLET CAPSULE ORAL ONCE A DAY
Status: ACTIVE | COMMUNITY

## 2023-03-27 RX ORDER — LEVOTHYROXINE SODIUM 125 UG/1
1 TABLET IN THE MORNING ON AN EMPTY STOMACH TABLET ORAL ONCE A DAY
Status: ACTIVE | COMMUNITY

## 2023-03-27 RX ORDER — SPIRONOLACTONE 25 MG/1
1/2 TABLET TABLET, FILM COATED ORAL ONCE A DAY
Status: ACTIVE | COMMUNITY

## 2023-03-27 RX ORDER — NADOLOL 20 MG/1
1 TABLET TABLET ORAL BID
Status: ON HOLD | COMMUNITY

## 2023-03-27 RX ORDER — METRONIDAZOLE 7.5 MG/G
1 APPLICATION CREAM TOPICAL TWICE A DAY
Status: ACTIVE | COMMUNITY

## 2023-03-27 NOTE — HPI-TODAY'S VISIT:
Patient is  a 66 yo white male who presents for follow up of cirrhosis with ascites due to fatty liver and alcohol, last seen Janurary 2023  He was originally referred by Dr. Borges and a copy will be sent to the referring.  3/17/23/The white blood cells 5.9 red blood cells 3.75 and this is low please share this with your primary care.  MCV 97, platelets 77 and this is low.  The glucose 117, creatinine 0.69, sodium 142, potassium 3.9, bilirubin 2.3, alkaline phosphatase 150, AST 70, ALT 17.  Previously the bilirubin was 2.2,  Alkaline phosphatase was 168, AST 95 and ALT 27.  Some of these have improved.  INR is 1.4.  As below had a trip recently and did still consume etoh. He had mainly at dinner so did not stop. weight stable. he has not scheduled The mri   recap 1/30/23 December 12 labs showed that your hep B surface antibody strongly positive at 631.  Good to see it is present and important to note that its come down a little bit from last year and it was 665.9 to the current 631. Hepatitis A IgM was negative and hep A immunity was seen so this would indicate that both hep a and B vaccines are still providing immunity for you. Your INR was up to 1.3 from previous 1.1 back in February.   Glucose was about the same and 112 from previous 111 in February BUN was 13 creatinine 0.64 sodium 138 potassium 4.1 albumin was 3.9. Your total globulin was slightly up at 4.1. Your AST was higher at 159 from 64 which suggests that your liver is more inflamed and your ALT is likewise higher at 33 from 18 and you need to work on the continued alcohol use issues as we discussed.   Your alkaline phosphatase was 174 also higher at 134. Your total bilirubin doubled from 1.0 to 2.0 and some concern about these labs and they are increased.  You really need to work on this issue. White blood cell count was 6.3 hemoglobin 13 platelet count was 98 and previously your plate count was 135,000 so that has gone down.  Your hemoglobin also has come down from 13.2 to 13. RBC count was diminished at 3.84 and your MCV was 96.6.  RDW was elevated at 16.1.  Please share these labs with your primary provider. Neutrophils were 4536 and lymphocytes were slightly low at 838.  These were previously normal back in February. Meld 12 and meld na 12. Prior had been around a 7 so that went up. Am concerned with the rising bili and labs and once see the tb start to rise with this,  it could go into a much higher crisis level and that needs to be followed for. I would recommend that you redo the labs in a month and do an in person or telemed visit here to see if the labs are better.  Please work on this issues as you can clearly see the labs are worse.  1/24/23 labs: cbc : rbc low  mcv 99, platelets 88,  glucose 168, cr 0.76, bilirubin 2.2, alk phos 125, ast 62, alt 18 1.4 MELD 13 peth pending Has reduced etoh and went 1 week in early january and none since 19th on jan discussed the bilirubin and has had a cold and could have impacted it   Patient will be out of the country feb 11-march 2 due to vacation viking cruise and he will do this. Discussed diet while on the cruise and encouraged him on the weight loss prior he was 208 and now 194 and he will stay off the etoh and work on diet and we will monitor this  EGD due in may   recap Aug 10 ultrasound shows liver with increased echogenicity and no lesions. No dilated bile ducts seen. Gallbladder decompressed with mild wall thickening.  Gracia sign is negative. Pancreas visualized portions unremarkable. Right kidney and left kidney 13 cm with no hydronephrosis. Spleen slightly enlarged at 13.8 cm. Liver vessels were not well evaluated due to the increased echogenicity. Trace ascites was seen. Overall the liver appeared to be fatty with the hepatic vasculature is not well seen due to poor penetration.  Splenomegaly was noted.  Trace ascites was seen.  Important to stay on a low-salt diet. I believe that we may be able to use this ultrasound as a justification to do a more advanced scan such as an MRI next time.  Your February 2022 MRI did not show any concerning lesions but did have the perfusion anomalies and I believe this scan was done as the MRI was declined.  Given the limitations seen of the ultrasound we will be able to then order the MRI for the next scan.  [place] notified insurance denied mri so jovanna do u.s and as expect if they cannot see perfusion changes can then ask for mri then.   May 27 egd sent by Dr Borges. Grade 1 esophageal varices seen in middle third esophagus and lower third of esophagis. Mild portal hypertensive gastropathy seen in stomach. No varices in stomach. Duodenum normal. Good to see no banding needed. Plan for redo in 1 yr.  Says weight is down a little. Prior BMI 43 and needs to work on this given fatty liver and says heart team cleared to exercise more. He says 3 weeks ago at primary 8 pounds.  s/p EGD 12/17/21- with dr. borges showing grade II EV, s/p 3 bands in distal esophagus, moderate portal hypertensive gastropathy, no gastric varices, no active bleeding.     February 5, 2022 MRI Lower thorax was normal. Liver showed fat deposition by fat fraction of 15 to 20% and desired is less than 6.4%. They do see chronic liver disease changes including lobar redistribution and nodular contour.   Perfusion anomalies seen in the liver which usually lead to a 6-month surveillance for this. Periesophageal, perigastric, and perisplenic varices seen as well as recanalized umbilical vein although but suggestive of portal hypertension is present. Spleen is 13 cm. Pancreas was normal. Kidneys show simple left renal cyst. Colon shows signs of diverticulosis but without inflammation.  Important for you to be on a high-fiber diet with this and make sure that your primary provider is aware of same. Atherosclerosis seen of the aorta without aneurysm.  Please share with primary provider as well. May want to check lipid levels. Degenerative changes seen of the spine.  Labs 1/14/22- afp 5.2 normal, glucose 98 normal, creatinine 0.66 slightly low (prev 0.65), sodium 141 normal, tbili 0.7 normal, alk phos 121 (prev 123), ast 77 high (prev 81), alt 34 normal (prev 23) hemoglobin 11.9 low (prev 10.6), wbc 6.6 normal, platelets 196 normal, rdw 17.6 high and  monocytes 1.0 high (please share with primary md), inr 1.1 normal, MELD 7, MELD-NA 7.  He was evaluated at [facility] on 10/7/21-10/11/21 for hgb 6.8 (also on eliquis)- admitted and given 2 units blood, hgb up to 9.1. Not enough fluid to tap per US.  No scopes performed.    He notes 3 days of small volume hematemesis in middle of Sept.  Wasn't eating much at the time.   He noted systolic BP <100 on several occasions since hospitalization.  They stopped carvedilol.   He was told he had fatty liver 2 years ago. states 'stomach getting bigger' in april 2021 timeframe. per pt was drinking more after jan 2021 and then stopped on may 15, 2021 when he was hospitalized.  Has since started drinking again.    Drinking 2 glasses of wine per day at time of Oct 2021 office visit.  Previously drinking 3-4 drinks daily.   Encouraged absolute alcohol cessation and rehab/therapy.   alk phos 127 (prev 129), ast 48 (prev 53), alt 14 (prev 16), MELD 7 on 10/6/21 labs.  Weight 237 11/2020.  205 1/2021 after stomach flu, 225 prior to para on 5/16/21.  Weight 216 in June 2021.  Weight 212 August.  EGD 5/20/21- 2 cords of small nonbleeding grade 1-2 esophgeal varices in the distal esophagus.  Most recent MRI 6/26/21 showing trace ascites (prior moderate on CT in May 2021)- on lasix 20 mg, spironolactone 12.5 mg/day and doing well with this.  Eating less salt but could do better with this.  No HSE, no signs of GI bleeding, no abdominal pain, jaundice, pruritus, fevers, chills, increased abdominal distention.  Was recommended to go to ED after 10/6/21 labs showing hgb 6.8.    July 26 MRI- Lower thorax was normal.  Liver showed some fat deposition as well as changes of chronic liver disease including lobar redistribution and nodular contour.  They also see some delayed particular enhancement of the liver which would be compatible with fibrosis. They see perfusion changes throughout the liver but no suspicious lesions.   Typically we redo an MRI in 6 months to follow these perfusion anomalies. They see varices near the esophagus, stomach and spleen area.  Some of the paraesophageal varices are submucosal.  So this is important to follow with egd as you are doing. The gallbladder and biliary tree was normal.   The spleen was 13.1 cm which many would consider slightly enlarged.  Pancreas was normal.  Simple left renal cyst was seen but no size given.  Some nonspecific lymph nodes were seen near the liver. They feel that those lymph nodes are likely reactive. You do have trace ascites so would be very important for you to remain on a low-salt diet and on your diuretics that you are on.  Low-salt diet is really the basis for the ascites control and then the diuretics help out more if you are already on that type of diet. Some mild degenerative changes were seen of the spine. Overall we would recommend repeat the MRI in 6 months. Important to note that on the previous CT from May of this year you had a cirrhotic appearance of liver but moderate intra-abdominal ascites that as well as the probable varices as well. The EGD was done on May 20 of this year and it showed 2 cords of small grade 1-2 esophageal varices that were seen.  hep c negative, iron sat low 7%-follow up with pcp or gi for this. ceruloplasmin 32. ferritin low 24. asma neg. ama neg. alpha 1 MM. shavonne neg. ast 34, alt 9. hgb low 9.8-need to see gi or pcp. plt 239. monocytes 1200.   CT triple phase 5/21/21 showed no definitive evidence of HCC, no enhancing liver masses, cirrhotic appearance of the liver with small to moderate intra-abdominal ascites and probable upper abdominal varices.  Esophageal and gastric varices may be present, patent portal veins.   Paracentesis 5/20/21 with 8.2L of fluid removed- path with numerous meseothelial cells and histiocytes, no atypical or malignant cells present.  Colonoscopy 5/20/21- severe left sided diverticulosis, tortuous left colon, 4 mm sessile sigmoid colon polyp removed, small nonbleeding internal hemorrhoids, limited exam beause of suboptimal prep, repeat 3 years.  EGD 5/20/21- 2 cords of small nonbleeding grade 1-2 esophgeal varices in the distal esophagus.    U/S doppler 5/19/21- hepatopetal flow of the liver. Pt here for follow up.  echo showed EF 55-60%

## 2023-04-12 ENCOUNTER — LAB OUTSIDE AN ENCOUNTER (OUTPATIENT)
Dept: URBAN - METROPOLITAN AREA CLINIC 86 | Facility: CLINIC | Age: 68
End: 2023-04-12

## 2023-04-19 ENCOUNTER — OFFICE VISIT (OUTPATIENT)
Dept: URBAN - METROPOLITAN AREA CLINIC 86 | Facility: CLINIC | Age: 68
End: 2023-04-19

## 2023-04-19 NOTE — HPI-TODAY'S VISIT:
Patient is  a 66 yo white male last seen march 2023 by MS Kinjal and who presents for follow up of cirrhosis with ascites due to fatty liver and alcohol.  He was originally referred by Dr. Borges and a copy will be sent to the referring.  Spoke with the patient about his labs today 2/6/23 labs with rbc low, he will share this with his pcp. THe bilirubin as not improve and the alp, ast, and alt all back up and prior tehy were imprroving as he had stopped the alcohol and now back up and when asked he had been drinking again. He will be out of the country for 20 days and leaving tomorrow. Recommend he check labs while away. He has ordes to do when he gets back. Discussed the risk of continued ETOH use and er precautions  3/17/23/The white blood cells 5.9 red blood cells 3.75 and this is low please share this with your primary care.  MCV 97, platelets 77 and this is low.  The glucose 117, creatinine 0.69, sodium 142, potassium 3.9, bilirubin 2.3, alkaline phosphatase 150, AST 70, ALT 17.  Previously the bilirubin was 2.2,  Alkaline phosphatase was 168, AST 95 and ALT 27.  Some of these have improved.  INR is 1.4.  As below had a trip recently and did still consume etoh. He had mainly at dinner so did not stop. weight stable. he has not scheduled The mri   recap 1/30/23 December 12 labs showed that your hep B surface antibody strongly positive at 631.  Good to see it is present and important to note that its come down a little bit from last year and it was 665.9 to the current 631. Hepatitis A IgM was negative and hep A immunity was seen so this would indicate that both hep a and B vaccines are still providing immunity for you. Your INR was up to 1.3 from previous 1.1 back in February.   Glucose was about the same and 112 from previous 111 in February BUN was 13 creatinine 0.64 sodium 138 potassium 4.1 albumin was 3.9. Your total globulin was slightly up at 4.1. Your AST was higher at 159 from 64 which suggests that your liver is more inflamed and your ALT is likewise higher at 33 from 18 and you need to work on the continued alcohol use issues as we discussed.   Your alkaline phosphatase was 174 also higher at 134. Your total bilirubin doubled from 1.0 to 2.0 and some concern about these labs and they are increased.  You really need to work on this issue. White blood cell count was 6.3 hemoglobin 13 platelet count was 98 and previously your plate count was 135,000 so that has gone down.  Your hemoglobin also has come down from 13.2 to 13. RBC count was diminished at 3.84 and your MCV was 96.6.  RDW was elevated at 16.1.  Please share these labs with your primary provider. Neutrophils were 4536 and lymphocytes were slightly low at 838.  These were previously normal back in February. Meld 12 and meld na 12. Prior had been around a 7 so that went up. Am concerned with the rising bili and labs and once see the tb start to rise with this,  it could go into a much higher crisis level and that needs to be followed for. I would recommend that you redo the labs in a month and do an in person or telemed visit here to see if the labs are better.  Please work on this issues as you can clearly see the labs are worse.  1/24/23 labs: cbc : rbc low  mcv 99, platelets 88,  glucose 168, cr 0.76, bilirubin 2.2, alk phos 125, ast 62, alt 18 1.4 MELD 13 peth pending Has reduced etoh and went 1 week in early january and none since 19th on jan discussed the bilirubin and has had a cold and could have impacted it   Patient will be out of the country feb 11-march 2 due to vacation viking cruise and he will do this. Discussed diet while on the cruise and encouraged him on the weight loss prior he was 208 and now 194 and he will stay off the etoh and work on diet and we will monitor this  EGD due in may   recap Aug 10 ultrasound shows liver with increased echogenicity and no lesions. No dilated bile ducts seen. Gallbladder decompressed with mild wall thickening.  Gracia sign is negative. Pancreas visualized portions unremarkable. Right kidney and left kidney 13 cm with no hydronephrosis. Spleen slightly enlarged at 13.8 cm. Liver vessels were not well evaluated due to the increased echogenicity. Trace ascites was seen. Overall the liver appeared to be fatty with the hepatic vasculature is not well seen due to poor penetration.  Splenomegaly was noted.  Trace ascites was seen.  Important to stay on a low-salt diet. I believe that we may be able to use this ultrasound as a justification to do a more advanced scan such as an MRI next time.  Your February 2022 MRI did not show any concerning lesions but did have the perfusion anomalies and I believe this scan was done as the MRI was declined.  Given the limitations seen of the ultrasound we will be able to then order the MRI for the next scan.  [place] notified insurance denied mri so jovanna do u.s and as expect if they cannot see perfusion changes can then ask for mri then.   May 27 egd sent by Dr Borges. Grade 1 esophageal varices seen in middle third esophagus and lower third of esophagis. Mild portal hypertensive gastropathy seen in stomach. No varices in stomach. Duodenum normal. Good to see no banding needed. Plan for redo in 1 yr.  Says weight is down a little. Prior BMI 43 and needs to work on this given fatty liver and says heart team cleared to exercise more. He says 3 weeks ago at primary 8 pounds.  s/p EGD 12/17/21- with dr. borges showing grade II EV, s/p 3 bands in distal esophagus, moderate portal hypertensive gastropathy, no gastric varices, no active bleeding.     February 5, 2022 MRI Lower thorax was normal. Liver showed fat deposition by fat fraction of 15 to 20% and desired is less than 6.4%. They do see chronic liver disease changes including lobar redistribution and nodular contour.   Perfusion anomalies seen in the liver which usually lead to a 6-month surveillance for this. Periesophageal, perigastric, and perisplenic varices seen as well as recanalized umbilical vein although but suggestive of portal hypertension is present. Spleen is 13 cm. Pancreas was normal. Kidneys show simple left renal cyst. Colon shows signs of diverticulosis but without inflammation.  Important for you to be on a high-fiber diet with this and make sure that your primary provider is aware of same. Atherosclerosis seen of the aorta without aneurysm.  Please share with primary provider as well. May want to check lipid levels. Degenerative changes seen of the spine.  Labs 1/14/22- afp 5.2 normal, glucose 98 normal, creatinine 0.66 slightly low (prev 0.65), sodium 141 normal, tbili 0.7 normal, alk phos 121 (prev 123), ast 77 high (prev 81), alt 34 normal (prev 23) hemoglobin 11.9 low (prev 10.6), wbc 6.6 normal, platelets 196 normal, rdw 17.6 high and  monocytes 1.0 high (please share with primary md), inr 1.1 normal, MELD 7, MELD-NA 7.  He was evaluated at [facility] on 10/7/21-10/11/21 for hgb 6.8 (also on eliquis)- admitted and given 2 units blood, hgb up to 9.1. Not enough fluid to tap per US.  No scopes performed.    He notes 3 days of small volume hematemesis in middle of Sept.  Wasn't eating much at the time.   He noted systolic BP <100 on several occasions since hospitalization.  They stopped carvedilol.   He was told he had fatty liver 2 years ago. states 'stomach getting bigger' in april 2021 timeframe. per pt was drinking more after jan 2021 and then stopped on may 15, 2021 when he was hospitalized.  Has since started drinking again.    Drinking 2 glasses of wine per day at time of Oct 2021 office visit.  Previously drinking 3-4 drinks daily.   Encouraged absolute alcohol cessation and rehab/therapy.   alk phos 127 (prev 129), ast 48 (prev 53), alt 14 (prev 16), MELD 7 on 10/6/21 labs.  Weight 237 11/2020.  205 1/2021 after stomach flu, 225 prior to para on 5/16/21.  Weight 216 in June 2021.  Weight 212 August.  EGD 5/20/21- 2 cords of small nonbleeding grade 1-2 esophgeal varices in the distal esophagus.  Most recent MRI 6/26/21 showing trace ascites (prior moderate on CT in May 2021)- on lasix 20 mg, spironolactone 12.5 mg/day and doing well with this.  Eating less salt but could do better with this.  No HSE, no signs of GI bleeding, no abdominal pain, jaundice, pruritus, fevers, chills, increased abdominal distention.  Was recommended to go to ED after 10/6/21 labs showing hgb 6.8.    July 26 MRI- Lower thorax was normal.  Liver showed some fat deposition as well as changes of chronic liver disease including lobar redistribution and nodular contour.  They also see some delayed particular enhancement of the liver which would be compatible with fibrosis. They see perfusion changes throughout the liver but no suspicious lesions.   Typically we redo an MRI in 6 months to follow these perfusion anomalies. They see varices near the esophagus, stomach and spleen area.  Some of the paraesophageal varices are submucosal.  So this is important to follow with egd as you are doing. The gallbladder and biliary tree was normal.   The spleen was 13.1 cm which many would consider slightly enlarged.  Pancreas was normal.  Simple left renal cyst was seen but no size given.  Some nonspecific lymph nodes were seen near the liver. They feel that those lymph nodes are likely reactive. You do have trace ascites so would be very important for you to remain on a low-salt diet and on your diuretics that you are on.  Low-salt diet is really the basis for the ascites control and then the diuretics help out more if you are already on that type of diet. Some mild degenerative changes were seen of the spine. Overall we would recommend repeat the MRI in 6 months. Important to note that on the previous CT from May of this year you had a cirrhotic appearance of liver but moderate intra-abdominal ascites that as well as the probable varices as well. The EGD was done on May 20 of this year and it showed 2 cords of small grade 1-2 esophageal varices that were seen.  hep c negative, iron sat low 7%-follow up with pcp or gi for this. ceruloplasmin 32. ferritin low 24. asma neg. ama neg. alpha 1 MM. shavonne neg. ast 34, alt 9. hgb low 9.8-need to see gi or pcp. plt 239. monocytes 1200.   CT triple phase 5/21/21 showed no definitive evidence of HCC, no enhancing liver masses, cirrhotic appearance of the liver with small to moderate intra-abdominal ascites and probable upper abdominal varices.  Esophageal and gastric varices may be present, patent portal veins.   Paracentesis 5/20/21 with 8.2L of fluid removed- path with numerous meseothelial cells and histiocytes, no atypical or malignant cells present.  Colonoscopy 5/20/21- severe left sided diverticulosis, tortuous left colon, 4 mm sessile sigmoid colon polyp removed, small nonbleeding internal hemorrhoids, limited exam beause of suboptimal prep, repeat 3 years.  EGD 5/20/21- 2 cords of small nonbleeding grade 1-2 esophgeal varices in the distal esophagus.    U/S doppler 5/19/21- hepatopetal flow of the liver. Pt here for follow up.  echo showed EF 55-60%   Duration of the visit was minutes with 5 minutes of chart prep and 25 minutes of TeleMed visit reviewing recent records current status and future plans for the patient.

## 2023-04-24 ENCOUNTER — LAB OUTSIDE AN ENCOUNTER (OUTPATIENT)
Dept: URBAN - METROPOLITAN AREA CLINIC 86 | Facility: CLINIC | Age: 68
End: 2023-04-24

## 2023-05-01 LAB
A/G RATIO: 1.3
ALBUMIN: 3.9
ALKALINE PHOSPHATASE: 156
ALT (SGPT): 16
AST (SGOT): 67
BASO (ABSOLUTE): 0
BASOS: 1
BILIRUBIN, DIRECT: 0.56
BILIRUBIN, TOTAL: 1.7
BUN/CREATININE RATIO: 19
BUN: 12
CALCIUM: 9
CARBON DIOXIDE, TOTAL: 23
CHLORIDE: 106
CREATININE: 0.64
EGFR: 103
EOS (ABSOLUTE): 0.1
EOS: 2
GLOBULIN, TOTAL: 3.1
GLUCOSE: 114
HEMATOCRIT: 40.7
HEMATOLOGY COMMENTS:: (no result)
HEMOGLOBIN: 14
IMMATURE CELLS: (no result)
IMMATURE GRANS (ABS): 0
IMMATURE GRANULOCYTES: 1
INR: 1.4
LYMPHS (ABSOLUTE): 0.8
LYMPHS: 19
MCH: 34.5
MCHC: 34.4
MCV: 100
MONOCYTES(ABSOLUTE): 0.7
MONOCYTES: 15
NEUTROPHILS (ABSOLUTE): 2.7
NEUTROPHILS: 62
NRBC: (no result)
PLATELETS: 69
POTASSIUM: 4.4
PROTEIN, TOTAL: 7
PROTHROMBIN TIME: 14.3
RBC: 4.06
RDW: 13.4
SODIUM: 142
WBC: 4.4

## 2023-05-15 ENCOUNTER — OFFICE VISIT (OUTPATIENT)
Dept: URBAN - METROPOLITAN AREA CLINIC 86 | Facility: CLINIC | Age: 68
End: 2023-05-15
Payer: MEDICARE

## 2023-05-15 VITALS
BODY MASS INDEX: 29.25 KG/M2 | SYSTOLIC BLOOD PRESSURE: 115 MMHG | WEIGHT: 193 LBS | DIASTOLIC BLOOD PRESSURE: 66 MMHG | TEMPERATURE: 97 F | HEIGHT: 68 IN | HEART RATE: 69 BPM

## 2023-05-15 DIAGNOSIS — R74.8 ABNORMAL LIVER ENZYMES: ICD-10-CM

## 2023-05-15 DIAGNOSIS — K74.60 CIRRHOSIS OF LIVER: ICD-10-CM

## 2023-05-15 DIAGNOSIS — R93.2 ABNORMAL LIVER DIAGNOSTIC IMAGING: ICD-10-CM

## 2023-05-15 DIAGNOSIS — K76.0 FATTY LIVER: ICD-10-CM

## 2023-05-15 PROCEDURE — 99214 OFFICE O/P EST MOD 30 MIN: CPT | Performed by: PHYSICIAN ASSISTANT

## 2023-05-15 RX ORDER — NADOLOL 20 MG/1
1 TABLET TABLET ORAL BID
Status: ON HOLD | COMMUNITY

## 2023-05-15 RX ORDER — LEVOTHYROXINE SODIUM 125 UG/1
1 TABLET IN THE MORNING ON AN EMPTY STOMACH TABLET ORAL ONCE A DAY
Status: ACTIVE | COMMUNITY

## 2023-05-15 RX ORDER — FUROSEMIDE 20 MG/1
1 TABLET TABLET ORAL ONCE A DAY
Status: ACTIVE | COMMUNITY

## 2023-05-15 RX ORDER — SPIRONOLACTONE 25 MG/1
1/2 TABLET TABLET, FILM COATED ORAL ONCE A DAY
Status: ACTIVE | COMMUNITY

## 2023-05-15 RX ORDER — METRONIDAZOLE 7.5 MG/G
1 APPLICATION CREAM TOPICAL TWICE A DAY
Status: ACTIVE | COMMUNITY

## 2023-05-15 RX ORDER — OMEGA-3/DHA/EPA/FISH OIL 120-180 MG
1 TABLET CAPSULE ORAL ONCE A DAY
Status: ACTIVE | COMMUNITY

## 2023-05-15 RX ORDER — THIAMINE HCL 100 MG
1 TABLET TABLET ORAL ONCE A DAY
Status: ACTIVE | COMMUNITY

## 2023-05-15 RX ORDER — FOLIC ACID 0.8 MG
1 TABLET TABLET ORAL ONCE A DAY
Status: ACTIVE | COMMUNITY

## 2023-05-15 NOTE — HPI-TODAY'S VISIT:
Patient is  a 66 yo white male last seen march 2023 by MS Kinjal and who presents for follow up of cirrhosis with ascites due to fatty liver and alcohol.  He was originally referred by Dr. Borges and a copy will be sent to the referring.  5/15/23 visit The May 13 MRI was sent to me. The liver calculated fat percentage is 4.2% which is not consistent with fatty liver.  They did see morphologic changes of chronic liver disease including lobar redistribution and nodular contour.  They also saw delayed reticular enhancement of the hepatic parenchyma which is compatible with fibrosis.  They also saw a few areas of perfusion anomalies throughout the liver but they did not see any suspicious lesions.  The hepatic vasculature is patent.  They saw paraesophageal, perigastric, and perisplenic varices and noted a recannulized umbilical vein.  The gallbladder and biliary tree appeared normal.  Spleen enlarged at 15.2 cm.  They thought the pancreas and adrenal glands were normal.  The kidneys with simple left renal cysts.  They saw colonic diverticula.  They saw minimal atherosclerotic disease without aortic aneurysm.  Please share this with the primary care.  They did see trace ascites and it is important that she stay on a low-salt diet.  They saw mild degenerative changes of the spine.  Overall the impression was that they saw morphological changes of chronic liver disease including portal hypertension and varices.  This is very important for you to stay on top of the endoscopies. We will review this at the follow-up. MELD 12  Creatinine 0.64, sodium 142, k 4.4, bilirubin 1.7, alp 156, ast 67, alt 16. rbc 4.06, mcv 100, platelets 69, inr 1.4 Discussed this and good to see less fat but need to monitor the labs   recap Spoke with the patient about his labs today 2/6/23 labs with rbc low, he will share this with his pcp. THe bilirubin as not improve and the alp, ast, and alt all back up and prior tehy were imprroving as he had stopped the alcohol and now back up and when asked he had been drinking again. He will be out of the country for 20 days and leaving tomorrow. Recommend he check labs while away. He has ordes to do when he gets back. Discussed the risk of continued ETOH use and er precautions  3/17/23/The white blood cells 5.9 red blood cells 3.75 and this is low please share this with your primary care.  MCV 97, platelets 77 and this is low.  The glucose 117, creatinine 0.69, sodium 142, potassium 3.9, bilirubin 2.3, alkaline phosphatase 150, AST 70, ALT 17.  Previously the bilirubin was 2.2,  Alkaline phosphatase was 168, AST 95 and ALT 27.  Some of these have improved.  INR is 1.4.  As below had a trip recently and did still consume etoh. He had mainly at dinner so did not stop. weight stable.   recap 1/30/23 December 12 labs showed that your hep B surface antibody strongly positive at 631.  Good to see it is present and important to note that its come down a little bit from last year and it was 665.9 to the current 631. Hepatitis A IgM was negative and hep A immunity was seen so this would indicate that both hep a and B vaccines are still providing immunity for you. Your INR was up to 1.3 from previous 1.1 back in February.   Glucose was about the same and 112 from previous 111 in February BUN was 13 creatinine 0.64 sodium 138 potassium 4.1 albumin was 3.9. Your total globulin was slightly up at 4.1. Your AST was higher at 159 from 64 which suggests that your liver is more inflamed and your ALT is likewise higher at 33 from 18 and you need to work on the continued alcohol use issues as we discussed.   Your alkaline phosphatase was 174 also higher at 134. Your total bilirubin doubled from 1.0 to 2.0 and some concern about these labs and they are increased.  You really need to work on this issue. White blood cell count was 6.3 hemoglobin 13 platelet count was 98 and previously your plate count was 135,000 so that has gone down.  Your hemoglobin also has come down from 13.2 to 13. RBC count was diminished at 3.84 and your MCV was 96.6.  RDW was elevated at 16.1.  Please share these labs with your primary provider. Neutrophils were 4536 and lymphocytes were slightly low at 838.  These were previously normal back in February. Meld 12 and meld na 12. Prior had been around a 7 so that went up. Am concerned with the rising bili and labs and once see the tb start to rise with this,  it could go into a much higher crisis level and that needs to be followed for. I would recommend that you redo the labs in a month and do an in person or telemed visit here to see if the labs are better.  Please work on this issues as you can clearly see the labs are worse.  1/24/23 labs: cbc : rbc low  mcv 99, platelets 88,  glucose 168, cr 0.76, bilirubin 2.2, alk phos 125, ast 62, alt 18 1.4 MELD 13 peth pending Has reduced etoh and went 1 week in early january and none since 19th on jan discussed the bilirubin and has had a cold and could have impacted it   Patient will be out of the country feb 11-march 2 due to vacation viking cruise and he will do this. Discussed diet while on the cruise and encouraged him on the weight loss prior he was 208 and now 194 and he will stay off the etoh and work on diet and we will monitor this  EGD due in may   recap Aug 10 ultrasound shows liver with increased echogenicity and no lesions. No dilated bile ducts seen. Gallbladder decompressed with mild wall thickening.  Gracia sign is negative. Pancreas visualized portions unremarkable. Right kidney and left kidney 13 cm with no hydronephrosis. Spleen slightly enlarged at 13.8 cm. Liver vessels were not well evaluated due to the increased echogenicity. Trace ascites was seen. Overall the liver appeared to be fatty with the hepatic vasculature is not well seen due to poor penetration.  Splenomegaly was noted.  Trace ascites was seen.  Important to stay on a low-salt diet. I believe that we may be able to use this ultrasound as a justification to do a more advanced scan such as an MRI next time.  Your February 2022 MRI did not show any concerning lesions but did have the perfusion anomalies and I believe this scan was done as the MRI was declined.  Given the limitations seen of the ultrasound we will be able to then order the MRI for the next scan.  [place] notified insurance denied mri so jovanna do u.s and as expect if they cannot see perfusion changes can then ask for mri then.   May 27 egd sent by Dr Borges. Grade 1 esophageal varices seen in middle third esophagus and lower third of esophagis. Mild portal hypertensive gastropathy seen in stomach. No varices in stomach. Duodenum normal. Good to see no banding needed. Plan for redo in 1 yr.  Says weight is down a little. Prior BMI 43 and needs to work on this given fatty liver and says heart team cleared to exercise more. He says 3 weeks ago at primary 8 pounds.  s/p EGD 12/17/21- with dr. borges showing grade II EV, s/p 3 bands in distal esophagus, moderate portal hypertensive gastropathy, no gastric varices, no active bleeding.     February 5, 2022 MRI Lower thorax was normal. Liver showed fat deposition by fat fraction of 15 to 20% and desired is less than 6.4%. They do see chronic liver disease changes including lobar redistribution and nodular contour.   Perfusion anomalies seen in the liver which usually lead to a 6-month surveillance for this. Periesophageal, perigastric, and perisplenic varices seen as well as recanalized umbilical vein although but suggestive of portal hypertension is present. Spleen is 13 cm. Pancreas was normal. Kidneys show simple left renal cyst. Colon shows signs of diverticulosis but without inflammation.  Important for you to be on a high-fiber diet with this and make sure that your primary provider is aware of same. Atherosclerosis seen of the aorta without aneurysm.  Please share with primary provider as well. May want to check lipid levels. Degenerative changes seen of the spine.  Labs 1/14/22- afp 5.2 normal, glucose 98 normal, creatinine 0.66 slightly low (prev 0.65), sodium 141 normal, tbili 0.7 normal, alk phos 121 (prev 123), ast 77 high (prev 81), alt 34 normal (prev 23) hemoglobin 11.9 low (prev 10.6), wbc 6.6 normal, platelets 196 normal, rdw 17.6 high and  monocytes 1.0 high (please share with primary md), inr 1.1 normal, MELD 7, MELD-NA 7.  He was evaluated at [facility] on 10/7/21-10/11/21 for hgb 6.8 (also on eliquis)- admitted and given 2 units blood, hgb up to 9.1. Not enough fluid to tap per US.  No scopes performed.    He notes 3 days of small volume hematemesis in middle of Sept.  Wasn't eating much at the time.   He noted systolic BP <100 on several occasions since hospitalization.  They stopped carvedilol.   He was told he had fatty liver 2 years ago. states 'stomach getting bigger' in april 2021 timeframe. per pt was drinking more after jan 2021 and then stopped on may 15, 2021 when he was hospitalized.  Has since started drinking again.    Drinking 2 glasses of wine per day at time of Oct 2021 office visit.  Previously drinking 3-4 drinks daily.   Encouraged absolute alcohol cessation and rehab/therapy.   alk phos 127 (prev 129), ast 48 (prev 53), alt 14 (prev 16), MELD 7 on 10/6/21 labs.  Weight 237 11/2020.  205 1/2021 after stomach flu, 225 prior to para on 5/16/21.  Weight 216 in June 2021.  Weight 212 August.  EGD 5/20/21- 2 cords of small nonbleeding grade 1-2 esophgeal varices in the distal esophagus.  Most recent MRI 6/26/21 showing trace ascites (prior moderate on CT in May 2021)- on lasix 20 mg, spironolactone 12.5 mg/day and doing well with this.  Eating less salt but could do better with this.  No HSE, no signs of GI bleeding, no abdominal pain, jaundice, pruritus, fevers, chills, increased abdominal distention.  Was recommended to go to ED after 10/6/21 labs showing hgb 6.8.    July 26 MRI- Lower thorax was normal.  Liver showed some fat deposition as well as changes of chronic liver disease including lobar redistribution and nodular contour.  They also see some delayed particular enhancement of the liver which would be compatible with fibrosis. They see perfusion changes throughout the liver but no suspicious lesions.   Typically we redo an MRI in 6 months to follow these perfusion anomalies. They see varices near the esophagus, stomach and spleen area.  Some of the paraesophageal varices are submucosal.  So this is important to follow with egd as you are doing. The gallbladder and biliary tree was normal.   The spleen was 13.1 cm which many would consider slightly enlarged.  Pancreas was normal.  Simple left renal cyst was seen but no size given.  Some nonspecific lymph nodes were seen near the liver. They feel that those lymph nodes are likely reactive. You do have trace ascites so would be very important for you to remain on a low-salt diet and on your diuretics that you are on.  Low-salt diet is really the basis for the ascites control and then the diuretics help out more if you are already on that type of diet. Some mild degenerative changes were seen of the spine. Overall we would recommend repeat the MRI in 6 months. Important to note that on the previous CT from May of this year you had a cirrhotic appearance of liver but moderate intra-abdominal ascites that as well as the probable varices as well. The EGD was done on May 20 of this year and it showed 2 cords of small grade 1-2 esophageal varices that were seen.  hep c negative, iron sat low 7%-follow up with pcp or gi for this. ceruloplasmin 32. ferritin low 24. asma neg. ama neg. alpha 1 MM. shavonne neg. ast 34, alt 9. hgb low 9.8-need to see gi or pcp. plt 239. monocytes 1200.   CT triple phase 5/21/21 showed no definitive evidence of HCC, no enhancing liver masses, cirrhotic appearance of the liver with small to moderate intra-abdominal ascites and probable upper abdominal varices.  Esophageal and gastric varices may be present, patent portal veins.   Paracentesis 5/20/21 with 8.2L of fluid removed- path with numerous meseothelial cells and histiocytes, no atypical or malignant cells present.  Colonoscopy 5/20/21- severe left sided diverticulosis, tortuous left colon, 4 mm sessile sigmoid colon polyp removed, small nonbleeding internal hemorrhoids, limited exam beause of suboptimal prep, repeat 3 years.  EGD 5/20/21- 2 cords of small nonbleeding grade 1-2 esophgeal varices in the distal esophagus.    U/S doppler 5/19/21- hepatopetal flow of the liver. Pt here for follow up.  echo showed EF 55-60%

## 2023-08-01 ENCOUNTER — LAB OUTSIDE AN ENCOUNTER (OUTPATIENT)
Dept: URBAN - METROPOLITAN AREA CLINIC 86 | Facility: CLINIC | Age: 68
End: 2023-08-01

## 2023-08-22 ENCOUNTER — OFFICE VISIT (OUTPATIENT)
Dept: URBAN - METROPOLITAN AREA CLINIC 86 | Facility: CLINIC | Age: 68
End: 2023-08-22
Payer: MEDICARE

## 2023-08-22 VITALS
WEIGHT: 186.5 LBS | TEMPERATURE: 97.6 F | BODY MASS INDEX: 28.26 KG/M2 | SYSTOLIC BLOOD PRESSURE: 137 MMHG | HEART RATE: 64 BPM | HEIGHT: 68 IN | DIASTOLIC BLOOD PRESSURE: 64 MMHG

## 2023-08-22 DIAGNOSIS — Z79.899 HIGH RISK MEDICATION USE: ICD-10-CM

## 2023-08-22 DIAGNOSIS — Z71.89 VACCINE COUNSELING: ICD-10-CM

## 2023-08-22 DIAGNOSIS — K76.89 LIVER LESION: ICD-10-CM

## 2023-08-22 DIAGNOSIS — I85.00 ESOPHAGEAL VARICES DETERMINED BY ENDOSCOPY: ICD-10-CM

## 2023-08-22 DIAGNOSIS — F10.20 ALCOHOL DEPENDENCE: ICD-10-CM

## 2023-08-22 DIAGNOSIS — R93.2 ABNORMAL LIVER DIAGNOSTIC IMAGING: ICD-10-CM

## 2023-08-22 DIAGNOSIS — R18.8 OTHER ASCITES: ICD-10-CM

## 2023-08-22 DIAGNOSIS — K74.60 UNSPECIFIED CIRRHOSIS OF LIVER: ICD-10-CM

## 2023-08-22 DIAGNOSIS — E66.01 MORBID (SEVERE) OBESITY DUE TO EXCESS CALORIES: ICD-10-CM

## 2023-08-22 DIAGNOSIS — R74.8 ABNORMAL LIVER ENZYMES: ICD-10-CM

## 2023-08-22 DIAGNOSIS — K76.0 FATTY LIVER: ICD-10-CM

## 2023-08-22 PROCEDURE — 99214 OFFICE O/P EST MOD 30 MIN: CPT | Performed by: PHYSICIAN ASSISTANT

## 2023-08-22 RX ORDER — FUROSEMIDE 20 MG/1
1 TABLET TABLET ORAL ONCE A DAY
Status: ACTIVE | COMMUNITY

## 2023-08-22 RX ORDER — LEVOTHYROXINE SODIUM 125 UG/1
1 TABLET IN THE MORNING ON AN EMPTY STOMACH TABLET ORAL ONCE A DAY
Status: ACTIVE | COMMUNITY

## 2023-08-22 RX ORDER — METRONIDAZOLE 7.5 MG/G
1 APPLICATION CREAM TOPICAL TWICE A DAY
Status: ACTIVE | COMMUNITY

## 2023-08-22 RX ORDER — FOLIC ACID 0.8 MG
1 TABLET TABLET ORAL ONCE A DAY
Status: ACTIVE | COMMUNITY

## 2023-08-22 RX ORDER — OMEGA-3/DHA/EPA/FISH OIL 120-180 MG
1 TABLET CAPSULE ORAL ONCE A DAY
Status: ACTIVE | COMMUNITY

## 2023-08-22 RX ORDER — NADOLOL 20 MG/1
1 TABLET TABLET ORAL BID
COMMUNITY

## 2023-08-22 RX ORDER — SPIRONOLACTONE 25 MG/1
1/2 TABLET TABLET, FILM COATED ORAL ONCE A DAY
Status: ACTIVE | COMMUNITY

## 2023-08-22 RX ORDER — THIAMINE HCL 100 MG
1 TABLET TABLET ORAL ONCE A DAY
Status: ACTIVE | COMMUNITY

## 2023-08-22 NOTE — HPI-TODAY'S VISIT:
Patient is  a 68 yo white male last seen May 2023 by MS Kinjal and who presents for follow up of cirrhosis with ascites due to fatty liver and alcohol.  He was originally referred by Dr. Borges and a copy will be sent to the referring.  8/22/23 ov  August 4, 2023 labs with hemoglobin A1c 5.8, INR 1.4 Complete blood count with white blood cells 4.5, red blood cells 3.5, hemoglobin 12.3, , platelets 82.  The sodium 137, potassium 4.1, creatinine 0.57, bilirubin 2.6, alkaline phosphatase 128, AST 60, ALT 23.  Ferritin 33. MELD 14 due for egd and needs to schedule this, reminded last visit  ETOH previously went 55 days without a dink  then went to travel and [facility] and just got back from 10 [place] in [place] and suspect this is affecting the labs  MRI due in novemeber  5/15/23 visit The May 13 MRI was sent to me. The liver calculated fat percentage is 4.2% which is not consistent with fatty liver.  They did see morphologic changes of chronic liver disease including lobar redistribution and nodular contour.  They also saw delayed reticular enhancement of the hepatic parenchyma which is compatible with fibrosis.  They also saw a few areas of perfusion anomalies throughout the liver but they did not see any suspicious lesions.  The hepatic vasculature is patent.  They saw paraesophageal, perigastric, and perisplenic varices and noted a recannulized umbilical vein.  The gallbladder and biliary tree appeared normal.  Spleen enlarged at 15.2 cm.  They thought the pancreas and adrenal glands were normal.  The kidneys with simple left renal cysts.  They saw colonic diverticula.  They saw minimal atherosclerotic disease without aortic aneurysm.  Please share this with the primary care.  They did see trace ascites and it is important that she stay on a low-salt diet.  They saw mild degenerative changes of the spine.  Overall the impression was that they saw morphological changes of chronic liver disease including portal hypertension and varices.  This is very important for you to stay on top of the endoscopies. We will review this at the follow-up. MELD 12  Creatinine 0.64, sodium 142, k 4.4, bilirubin 1.7, alp 156, ast 67, alt 16. rbc 4.06, mcv 100, platelets 69, inr 1.4 Discussed this and good to see less fat but need to monitor the labs   recap Spoke with the patient about his labs today 2/6/23 labs with rbc low, he will share this with his pcp. THe bilirubin as not improve and the alp, ast, and alt all back up and prior tehy were imprroving as he had stopped the alcohol and now back up and when asked he had been drinking again. He will be out of the country for 20 days and leaving tomorrow. Recommend he check labs while away. He has ordes to do when he gets back. Discussed the risk of continued ETOH use and er precautions  3/17/23/The white blood cells 5.9 red blood cells 3.75 and this is low please share this with your primary care.  MCV 97, platelets 77 and this is low.  The glucose 117, creatinine 0.69, sodium 142, potassium 3.9, bilirubin 2.3, alkaline phosphatase 150, AST 70, ALT 17.  Previously the bilirubin was 2.2,  Alkaline phosphatase was 168, AST 95 and ALT 27.  Some of these have improved.  INR is 1.4.  As below had a trip recently and did still consume etoh. He had mainly at dinner so did not stop. weight stable.   recap 1/30/23 December 12 labs showed that your hep B surface antibody strongly positive at 631.  Good to see it is present and important to note that its come down a little bit from last year and it was 665.9 to the current 631. Hepatitis A IgM was negative and hep A immunity was seen so this would indicate that both hep a and B vaccines are still providing immunity for you. Your INR was up to 1.3 from previous 1.1 back in February.   Glucose was about the same and 112 from previous 111 in February BUN was 13 creatinine 0.64 sodium 138 potassium 4.1 albumin was 3.9. Your total globulin was slightly up at 4.1. Your AST was higher at 159 from 64 which suggests that your liver is more inflamed and your ALT is likewise higher at 33 from 18 and you need to work on the continued alcohol use issues as we discussed.   Your alkaline phosphatase was 174 also higher at 134. Your total bilirubin doubled from 1.0 to 2.0 and some concern about these labs and they are increased.  You really need to work on this issue. White blood cell count was 6.3 hemoglobin 13 platelet count was 98 and previously your plate count was 135,000 so that has gone down.  Your hemoglobin also has come down from 13.2 to 13. RBC count was diminished at 3.84 and your MCV was 96.6.  RDW was elevated at 16.1.  Please share these labs with your primary provider. Neutrophils were 4536 and lymphocytes were slightly low at 838.  These were previously normal back in February. Meld 12 and meld na 12. Prior had been around a 7 so that went up. Am concerned with the rising bili and labs and once see the tb start to rise with this,  it could go into a much higher crisis level and that needs to be followed for. I would recommend that you redo the labs in a month and do an in person or telemed visit here to see if the labs are better.  Please work on this issues as you can clearly see the labs are worse.  1/24/23 labs: cbc : rbc low  mcv 99, platelets 88,  glucose 168, cr 0.76, bilirubin 2.2, alk phos 125, ast 62, alt 18 1.4 MELD 13 peth pending Has reduced etoh and went 1 week in early january and none since 19th on jan discussed the bilirubin and has had a cold and could have impacted it   Patient will be out of the country feb 11-march 2 due to vacation viking cruise and he will do this. Discussed diet while on the cruise and encouraged him on the weight loss prior he was 208 and now 194 and he will stay off the etoh and work on diet and we will monitor this  EGD due in may   recap Aug 10 ultrasound shows liver with increased echogenicity and no lesions. No dilated bile ducts seen. Gallbladder decompressed with mild wall thickening.  Gracia sign is negative. Pancreas visualized portions unremarkable. Right kidney and left kidney 13 cm with no hydronephrosis. Spleen slightly enlarged at 13.8 cm. Liver vessels were not well evaluated due to the increased echogenicity. Trace ascites was seen. Overall the liver appeared to be fatty with the hepatic vasculature is not well seen due to poor penetration.  Splenomegaly was noted.  Trace ascites was seen.  Important to stay on a low-salt diet. I believe that we may be able to use this ultrasound as a justification to do a more advanced scan such as an MRI next time.  Your February 2022 MRI did not show any concerning lesions but did have the perfusion anomalies and I believe this scan was done as the MRI was declined.  Given the limitations seen of the ultrasound we will be able to then order the MRI for the next scan.  Rose notified insurance denied mri so jovanna do u.s and as expect if they cannot see perfusion changes can then ask for mri then.   May 27 egd sent by Dr Borges. Grade 1 esophageal varices seen in middle third esophagus and lower third of esophagis. Mild portal hypertensive gastropathy seen in stomach. No varices in stomach. Duodenum normal. Good to see no banding needed. Plan for redo in 1 yr.  Says weight is down a little. Prior BMI 43 and needs to work on this given fatty liver and says heart team cleared to exercise more. He says 3 weeks ago at [facility] 8 pounds.  s/p EGD 12/17/21- with dr. borges showing grade II EV, s/p 3 bands in distal esophagus, moderate portal hypertensive gastropathy, no gastric varices, no active bleeding.     February 5, 2022 MRI Lower thorax was normal. Liver showed fat deposition by fat fraction of 15 to 20% and desired is less than 6.4%. They do see chronic liver disease changes including lobar redistribution and nodular contour.   Perfusion anomalies seen in the liver which usually lead to a 6-month surveillance for this. Periesophageal, perigastric, and perisplenic varices seen as well as recanalized umbilical vein although but suggestive of portal hypertension is present. Spleen is 13 cm. Pancreas was normal. Kidneys show simple left renal cyst. Colon shows signs of diverticulosis but without inflammation.  Important for you to be on a high-fiber diet with this and make sure that your primary provider is aware of same. Atherosclerosis seen of the aorta without aneurysm.  Please share with primary provider as well. May want to check lipid levels. Degenerative changes seen of the spine.  Labs 1/14/22- afp 5.2 normal, glucose 98 normal, creatinine 0.66 slightly low (prev 0.65), sodium 141 normal, tbili 0.7 normal, alk phos 121 (prev 123), ast 77 high (prev 81), alt 34 normal (prev 23) hemoglobin 11.9 low (prev 10.6), wbc 6.6 normal, platelets 196 normal, rdw 17.6 high and  monocytes 1.0 high (please share with primary md), inr 1.1 normal, MELD 7, MELD-NA 7.  He was evaluated at [facility] on 10/7/21-10/11/21 for hgb 6.8 (also on eliquis)- admitted and given 2 units blood, hgb up to 9.1. Not enough fluid to tap per US.  No scopes performed.    He notes 3 days of small volume hematemesis in middle of Sept.  Wasn't eating much at the time.   He noted systolic BP <100 on several occasions since hospitalization.  They stopped carvedilol.   He was told he had fatty liver 2 years ago. states 'stomach getting bigger' in april 2021 timeframe. per pt was drinking more after jan 2021 and then stopped on may 15, 2021 when he was hospitalized.  Has since started drinking again.    Drinking 2 glasses of wine per day at time of Oct 2021 office visit.  Previously drinking 3-4 drinks daily.   Encouraged absolute alcohol cessation and rehab/therapy.   alk phos 127 (prev 129), ast 48 (prev 53), alt 14 (prev 16), MELD 7 on 10/6/21 labs.  Weight 237 11/2020.  205 1/2021 after stomach flu, 225 prior to para on 5/16/21.  Weight 216 in June 2021.  Weight 212 August.  EGD 5/20/21- 2 cords of small nonbleeding grade 1-2 esophgeal varices in the distal esophagus.  Most recent MRI 6/26/21 showing trace ascites (prior moderate on CT in May 2021)- on lasix 20 mg, spironolactone 12.5 mg/day and doing well with this.  Eating less salt but could do better with this.  No HSE, no signs of GI bleeding, no abdominal pain, jaundice, pruritus, fevers, chills, increased abdominal distention.  Was recommended to go to ED after 10/6/21 labs showing hgb 6.8.    July 26 MRI- Lower thorax was normal.  Liver showed some fat deposition as well as changes of chronic liver disease including lobar redistribution and nodular contour.  They also see some delayed particular enhancement of the liver which would be compatible with fibrosis. They see perfusion changes throughout the liver but no suspicious lesions.   Typically we redo an MRI in 6 months to follow these perfusion anomalies. They see varices near the esophagus, stomach and spleen area.  Some of the paraesophageal varices are submucosal.  So this is important to follow with egd as you are doing. The gallbladder and biliary tree was normal.   The spleen was 13.1 cm which many would consider slightly enlarged.  Pancreas was normal.  Simple left renal cyst was seen but no size given.  Some nonspecific lymph nodes were seen near the liver. They feel that those lymph nodes are likely reactive. You do have trace ascites so would be very important for you to remain on a low-salt diet and on your diuretics that you are on.  Low-salt diet is really the basis for the ascites control and then the diuretics help out more if you are already on that type of diet. Some mild degenerative changes were seen of the spine. Overall we would recommend repeat the MRI in 6 months. Important to note that on the previous CT from May of this year you had a cirrhotic appearance of liver but moderate intra-abdominal ascites that as well as the probable varices as well. The EGD was done on May 20 of this year and it showed 2 cords of small grade 1-2 esophageal varices that were seen.  hep c negative, iron sat low 7%-follow up with pcp or gi for this. ceruloplasmin 32. ferritin low 24. asma neg. ama neg. alpha 1 MM. shavonne neg. ast 34, alt 9. hgb low 9.8-need to see gi or pcp. plt 239. monocytes 1200.   CT triple phase 5/21/21 showed no definitive evidence of HCC, no enhancing liver masses, cirrhotic appearance of the liver with small to moderate intra-abdominal ascites and probable upper abdominal varices.  Esophageal and gastric varices may be present, patent portal veins.   Paracentesis 5/20/21 with 8.2L of fluid removed- path with numerous meseothelial cells and histiocytes, no atypical or malignant cells present.  Colonoscopy 5/20/21- severe left sided diverticulosis, tortuous left colon, 4 mm sessile sigmoid colon polyp removed, small nonbleeding internal hemorrhoids, limited exam beause of suboptimal prep, repeat 3 years.  EGD 5/20/21- 2 cords of small nonbleeding grade 1-2 esophgeal varices in the distal esophagus.    U/S doppler 5/19/21- hepatopetal flow of the liver. Pt here for follow up.  echo showed EF 55-60%

## 2023-09-15 ENCOUNTER — WEB ENCOUNTER (OUTPATIENT)
Dept: URBAN - METROPOLITAN AREA CLINIC 86 | Facility: CLINIC | Age: 68
End: 2023-09-15

## 2023-10-13 NOTE — PHYSICAL EXAM CONSTITUTIONAL:
Pt Name: JULIO WYNNE  MRN: [id number]      This is a 79 year old male who is s/p left IMN for a left IT fracture. DOS 10/9, POD 3. Patient offers no complaints at this time. Denies numbness or tingling down the left leg. Patient has been working with PT. Denies chest pain, nausea or vomitting       PAST MEDICAL & SURGICAL HISTORY:  COPD (chronic obstructive pulmonary disease)  CAD (coronary artery disease)  ESRD on dialysis  Hypothyroidism  HTN (hypertension)  HLD (hyperlipidemia)  DVT, lower extremity    Allergies: No Known Allergies    Medications: acetaminophen     Tablet .. 975 milliGRAM(s) Oral every 8 hours  aluminum hydroxide/magnesium hydroxide/simethicone Suspension 30 milliLiter(s) Oral every 4 hours PRN  atorvastatin 40 milliGRAM(s) Oral at bedtime  calcium acetate 667 milliGRAM(s) Oral three times a day with meals  chlorhexidine 2% Cloths 1 Application(s) Topical daily  citalopram 20 milliGRAM(s) Oral daily  enoxaparin Injectable 40 milliGRAM(s) SubCutaneous every 24 hours  hydrALAZINE 50 milliGRAM(s) Oral three times a day  hydrALAZINE Injectable 10 milliGRAM(s) IV Push every 8 hours PRN  HYDROmorphone   Tablet 2 milliGRAM(s) Oral every 4 hours PRN  levothyroxine 25 MICROGram(s) Oral daily  melatonin 3 milliGRAM(s) Oral at bedtime  metoprolol succinate  milliGRAM(s) Oral daily  mirtazapine 7.5 milliGRAM(s) Oral at bedtime  mupirocin 2% Nasal 1 Application(s) Both Nostrils two times a day  oxyCODONE    IR 5 milliGRAM(s) Oral every 4 hours PRN  oxyCODONE    IR 10 milliGRAM(s) Oral every 4 hours PRN  pantoprazole    Tablet 40 milliGRAM(s) Oral before breakfast  polyethylene glycol 3350 17 Gram(s) Oral daily PRN  senna 2 Tablet(s) Oral at bedtime                          10.5   6.77  )-----------( 157      ( 12 Oct 2023 06:01 )             34.8     10-12    137  |  99  |  31.8<H>  ----------------------------<  98  4.4   |  27.0  |  3.79<H>    Ca    7.7<L>      12 Oct 2023 06:01        PHYSICAL EXAM:    Vital Signs Last 24 Hrs  T(C): 36.7 (13 Oct 2023 05:00), Max: 37.1 (12 Oct 2023 11:41)  T(F): 98 (13 Oct 2023 05:00), Max: 98.7 (12 Oct 2023 11:41)  HR: 75 (13 Oct 2023 05:00) (63 - 86)  BP: 144/65 (13 Oct 2023 05:00) (120/67 - 178/82)  BP(mean): --  RR: 18 (13 Oct 2023 05:00) (18 - 18)  SpO2: 97% (13 Oct 2023 05:00) (94% - 97%)    Parameters below as of 13 Oct 2023 05:00  Patient On (Oxygen Delivery Method): nasal cannula  O2 Flow (L/min): 2    Daily     Appearance: Alert, responsive, in no acute distress.  Neurological: Sensation is grossly intact to light touch. No focal deficits or weaknesses found.  Skin: no rash on visible skin. Skin is clean, dry and intact. No bleeding. No abrasions. No ulcerations.  Vascular: 2+ distal pulses. Cap refill < 2 sec. No signs of venous insufficiency or stasis. No extremity ulcerations. No cyanosis.  Musculoskeletal - Left Lower Extremity: Patient can dorsiflex and plantar flex. EHL 4/5. Dressing is clean, dry, and intact. FROM of the left ankle and toes. 2+ Dorsalis pedis pulse. Negative calf tenderness.     A/P:  Pt is a  79y Male s/p IMN of the left hip POD 3    PLAN:   - Pain Control  - DVT ppx as prescribed lov 40 daily  - Continue working with PT  - Weight bearing status: WBAT well developed, well nourished , in no acute distress , ambulating without difficulty , normal communication ability Pt Name: JULIO WYNNE  MRN: [id number]      This is a 79 year old male who is s/p left IMN for a left IT fracture. DOS 10/10, POD 3. Patient offers no complaints at this time. Denies numbness or tingling down the left leg. Patient has been working with PT. Denies chest pain, nausea or vomitting       PAST MEDICAL & SURGICAL HISTORY:  COPD (chronic obstructive pulmonary disease)  CAD (coronary artery disease)  ESRD on dialysis  Hypothyroidism  HTN (hypertension)  HLD (hyperlipidemia)  DVT, lower extremity    Allergies: No Known Allergies    Medications: acetaminophen     Tablet .. 975 milliGRAM(s) Oral every 8 hours  aluminum hydroxide/magnesium hydroxide/simethicone Suspension 30 milliLiter(s) Oral every 4 hours PRN  atorvastatin 40 milliGRAM(s) Oral at bedtime  calcium acetate 667 milliGRAM(s) Oral three times a day with meals  chlorhexidine 2% Cloths 1 Application(s) Topical daily  citalopram 20 milliGRAM(s) Oral daily  enoxaparin Injectable 40 milliGRAM(s) SubCutaneous every 24 hours  hydrALAZINE 50 milliGRAM(s) Oral three times a day  hydrALAZINE Injectable 10 milliGRAM(s) IV Push every 8 hours PRN  HYDROmorphone   Tablet 2 milliGRAM(s) Oral every 4 hours PRN  levothyroxine 25 MICROGram(s) Oral daily  melatonin 3 milliGRAM(s) Oral at bedtime  metoprolol succinate  milliGRAM(s) Oral daily  mirtazapine 7.5 milliGRAM(s) Oral at bedtime  mupirocin 2% Nasal 1 Application(s) Both Nostrils two times a day  oxyCODONE    IR 5 milliGRAM(s) Oral every 4 hours PRN  oxyCODONE    IR 10 milliGRAM(s) Oral every 4 hours PRN  pantoprazole    Tablet 40 milliGRAM(s) Oral before breakfast  polyethylene glycol 3350 17 Gram(s) Oral daily PRN  senna 2 Tablet(s) Oral at bedtime                          10.5   6.77  )-----------( 157      ( 12 Oct 2023 06:01 )             34.8     10-12    137  |  99  |  31.8<H>  ----------------------------<  98  4.4   |  27.0  |  3.79<H>    Ca    7.7<L>      12 Oct 2023 06:01        PHYSICAL EXAM:    Vital Signs Last 24 Hrs  T(C): 36.7 (13 Oct 2023 05:00), Max: 37.1 (12 Oct 2023 11:41)  T(F): 98 (13 Oct 2023 05:00), Max: 98.7 (12 Oct 2023 11:41)  HR: 75 (13 Oct 2023 05:00) (63 - 86)  BP: 144/65 (13 Oct 2023 05:00) (120/67 - 178/82)  BP(mean): --  RR: 18 (13 Oct 2023 05:00) (18 - 18)  SpO2: 97% (13 Oct 2023 05:00) (94% - 97%)    Parameters below as of 13 Oct 2023 05:00  Patient On (Oxygen Delivery Method): nasal cannula  O2 Flow (L/min): 2    Daily     Appearance: Alert, responsive, in no acute distress.  Neurological: Sensation is grossly intact to light touch. No focal deficits or weaknesses found.  Skin: no rash on visible skin. Skin is clean, dry and intact. No bleeding. No abrasions. No ulcerations.  Vascular: 2+ distal pulses. Cap refill < 2 sec. No signs of venous insufficiency or stasis. No extremity ulcerations. No cyanosis.  Musculoskeletal - Left Lower Extremity: Patient can dorsiflex and plantar flex. EHL 4/5. Dressing is clean, dry, and intact. FROM of the left ankle and toes. 2+ Dorsalis pedis pulse. Negative calf tenderness.     A/P:  Pt is a  79y Male s/p IMN of the left hip POD 3    PLAN:   - Pain Control  - DVT ppx as prescribed lov 40 daily  - Continue working with PT  - Weight bearing status: WBAT

## 2023-10-16 ENCOUNTER — TELEPHONE ENCOUNTER (OUTPATIENT)
Dept: URBAN - METROPOLITAN AREA CLINIC 25 | Facility: CLINIC | Age: 68
End: 2023-10-16

## 2023-10-19 ENCOUNTER — LAB OUTSIDE AN ENCOUNTER (OUTPATIENT)
Dept: URBAN - METROPOLITAN AREA CLINIC 25 | Facility: CLINIC | Age: 68
End: 2023-10-19

## 2023-11-01 ENCOUNTER — LAB OUTSIDE AN ENCOUNTER (OUTPATIENT)
Dept: URBAN - METROPOLITAN AREA CLINIC 86 | Facility: CLINIC | Age: 68
End: 2023-11-01

## 2023-11-13 ENCOUNTER — OFFICE VISIT (OUTPATIENT)
Dept: URBAN - METROPOLITAN AREA MEDICAL CENTER 8 | Facility: MEDICAL CENTER | Age: 68
End: 2023-11-13
Payer: MEDICARE

## 2023-11-13 DIAGNOSIS — K76.6 CLINICALLY SIGNIFICANT PORTAL HYPERTENSION: ICD-10-CM

## 2023-11-13 DIAGNOSIS — I85.10 ESOPH VARICE OTHER DIS: ICD-10-CM

## 2023-11-13 DIAGNOSIS — K31.89 OTHER DISEASES OF STOMACH AND DUODENUM: ICD-10-CM

## 2023-11-13 PROCEDURE — 43235 EGD DIAGNOSTIC BRUSH WASH: CPT | Performed by: INTERNAL MEDICINE

## 2023-11-13 RX ORDER — OMEGA-3/DHA/EPA/FISH OIL 120-180 MG
1 TABLET CAPSULE ORAL ONCE A DAY
Status: ACTIVE | COMMUNITY

## 2023-11-13 RX ORDER — METRONIDAZOLE 7.5 MG/G
1 APPLICATION CREAM TOPICAL TWICE A DAY
Status: ACTIVE | COMMUNITY

## 2023-11-13 RX ORDER — NADOLOL 20 MG/1
1 TABLET TABLET ORAL BID
COMMUNITY

## 2023-11-13 RX ORDER — FOLIC ACID 0.8 MG
1 TABLET TABLET ORAL ONCE A DAY
Status: ACTIVE | COMMUNITY

## 2023-11-13 RX ORDER — THIAMINE HCL 100 MG
1 TABLET TABLET ORAL ONCE A DAY
Status: ACTIVE | COMMUNITY

## 2023-11-13 RX ORDER — SPIRONOLACTONE 25 MG/1
1/2 TABLET TABLET, FILM COATED ORAL ONCE A DAY
Status: ACTIVE | COMMUNITY

## 2023-11-13 RX ORDER — FUROSEMIDE 20 MG/1
1 TABLET TABLET ORAL ONCE A DAY
Status: ACTIVE | COMMUNITY

## 2023-11-13 RX ORDER — LEVOTHYROXINE SODIUM 125 UG/1
1 TABLET IN THE MORNING ON AN EMPTY STOMACH TABLET ORAL ONCE A DAY
Status: ACTIVE | COMMUNITY

## 2023-11-15 ENCOUNTER — LAB OUTSIDE AN ENCOUNTER (OUTPATIENT)
Dept: URBAN - METROPOLITAN AREA CLINIC 86 | Facility: CLINIC | Age: 68
End: 2023-11-15

## 2023-11-22 ENCOUNTER — OFFICE VISIT (OUTPATIENT)
Dept: URBAN - METROPOLITAN AREA CLINIC 86 | Facility: CLINIC | Age: 68
End: 2023-11-22

## 2023-11-22 ENCOUNTER — TELEPHONE ENCOUNTER (OUTPATIENT)
Dept: URBAN - METROPOLITAN AREA CLINIC 86 | Facility: CLINIC | Age: 68
End: 2023-11-22

## 2023-11-22 NOTE — HPI-TODAY'S VISIT:
Dear Alfredito Calhoun,  The 11/15/23 mri was sent to me.  The liver with morphologic changes of chronic liver disease but they did not see any lesions.  Fat quantification was 4%.  They did not see any significant iron.  The gallbladder biliary tree appeared normal.  The spleen top normal in size at 12 cm.  The pancreas, adrenal glands normal.  They saw a renal cyst, please share this with the primary care.  They saw small paraesophageal perisplenic and perigastric varices as well as a recanalized umbilical vein.  They noted that the portal vasculature was patent. Need to be sure to stay on top of the EGDs.  They also saw ascites which stated was worse than last time and really need to make sure you watch the salt.  Overall they saw cirrhosis and evidence of portal hypertension with worsening ascites and we will review at your upcoming visit.  Flor Layton PA-C

## 2023-11-23 LAB
A/G RATIO: 1.2
ALBUMIN: 3.3
ALKALINE PHOSPHATASE: 122
ALT (SGPT): 16
AST (SGOT): 39
BASO (ABSOLUTE): 0.1
BASOS: 1
BILIRUBIN, TOTAL: 2.2
BUN/CREATININE RATIO: 19
BUN: 14
CALCIUM: 8.3
CARBON DIOXIDE, TOTAL: 24
CHLORIDE: 101
CREATININE: 0.75
EGFR: 98
EOS (ABSOLUTE): 0.1
EOS: 1
GLOBULIN, TOTAL: 2.8
GLUCOSE: 111
HEMATOCRIT: 26
HEMATOLOGY COMMENTS:: (no result)
HEMOGLOBIN: 8.6
IMMATURE CELLS: (no result)
IMMATURE GRANS (ABS): 0
IMMATURE GRANULOCYTES: 0
INR: 1.4
LYMPHS (ABSOLUTE): 1
LYMPHS: 16
MCH: 31.7
MCHC: 33.1
MCV: 96
MONOCYTES(ABSOLUTE): 0.9
MONOCYTES: 14
NEUTROPHILS (ABSOLUTE): 4
NEUTROPHILS: 68
NRBC: (no result)
PLATELETS: 131
POTASSIUM: 4.2
PROTEIN, TOTAL: 6.1
PROTHROMBIN TIME: 14.4
RBC: 2.71
RDW: 14
SODIUM: 137
WBC: 6

## 2023-11-28 ENCOUNTER — OFFICE VISIT (OUTPATIENT)
Dept: URBAN - METROPOLITAN AREA CLINIC 86 | Facility: CLINIC | Age: 68
End: 2023-11-28
Payer: MEDICARE

## 2023-11-28 ENCOUNTER — TELEPHONE ENCOUNTER (OUTPATIENT)
Dept: URBAN - METROPOLITAN AREA CLINIC 86 | Facility: CLINIC | Age: 68
End: 2023-11-28

## 2023-11-28 VITALS
BODY MASS INDEX: 32.43 KG/M2 | SYSTOLIC BLOOD PRESSURE: 115 MMHG | HEART RATE: 82 BPM | DIASTOLIC BLOOD PRESSURE: 59 MMHG | WEIGHT: 214 LBS | TEMPERATURE: 98.8 F | HEIGHT: 68 IN

## 2023-11-28 DIAGNOSIS — I85.00 ESOPHAGEAL VARICES DETERMINED BY ENDOSCOPY: ICD-10-CM

## 2023-11-28 DIAGNOSIS — R74.8 ABNORMAL LIVER ENZYMES: ICD-10-CM

## 2023-11-28 DIAGNOSIS — R93.2 ABNORMAL LIVER DIAGNOSTIC IMAGING: ICD-10-CM

## 2023-11-28 DIAGNOSIS — R18.8 OTHER ASCITES: ICD-10-CM

## 2023-11-28 DIAGNOSIS — K76.0 FATTY LIVER: ICD-10-CM

## 2023-11-28 DIAGNOSIS — F10.20 ALCOHOL DEPENDENCE: ICD-10-CM

## 2023-11-28 DIAGNOSIS — Z71.89 VACCINE COUNSELING: ICD-10-CM

## 2023-11-28 DIAGNOSIS — Z79.899 HIGH RISK MEDICATION USE: ICD-10-CM

## 2023-11-28 DIAGNOSIS — K74.60 UNSPECIFIED CIRRHOSIS OF LIVER: ICD-10-CM

## 2023-11-28 DIAGNOSIS — E66.01 MORBID (SEVERE) OBESITY DUE TO EXCESS CALORIES: ICD-10-CM

## 2023-11-28 DIAGNOSIS — K76.89 LIVER LESION: ICD-10-CM

## 2023-11-28 PROCEDURE — 99214 OFFICE O/P EST MOD 30 MIN: CPT | Performed by: PHYSICIAN ASSISTANT

## 2023-11-28 RX ORDER — FUROSEMIDE 40 MG/1
1 TABLET TABLET ORAL ONCE A DAY
Qty: 30 TABLET | Refills: 2

## 2023-11-28 RX ORDER — SPIRONOLACTONE 25 MG/1
1/2 TABLET TABLET, FILM COATED ORAL ONCE A DAY
Status: ACTIVE | COMMUNITY

## 2023-11-28 RX ORDER — LEVOTHYROXINE SODIUM 125 UG/1
1 TABLET IN THE MORNING ON AN EMPTY STOMACH TABLET ORAL ONCE A DAY
Status: ACTIVE | COMMUNITY

## 2023-11-28 RX ORDER — METRONIDAZOLE 7.5 MG/G
1 APPLICATION CREAM TOPICAL TWICE A DAY
Status: ACTIVE | COMMUNITY

## 2023-11-28 RX ORDER — FUROSEMIDE 20 MG/1
1 TABLET TABLET ORAL ONCE A DAY
Status: ACTIVE | COMMUNITY

## 2023-11-28 RX ORDER — OMEGA-3/DHA/EPA/FISH OIL 120-180 MG
1 TABLET CAPSULE ORAL ONCE A DAY
Status: ACTIVE | COMMUNITY

## 2023-11-28 RX ORDER — FOLIC ACID 0.8 MG
1 TABLET TABLET ORAL ONCE A DAY
Status: ACTIVE | COMMUNITY

## 2023-11-28 RX ORDER — SPIRONOLACTONE 50 MG/1
1 TABLET TABLET, FILM COATED ORAL ONCE A DAY
Qty: 30 TABLET | Refills: 2

## 2023-11-28 RX ORDER — THIAMINE HCL 100 MG
1 TABLET TABLET ORAL ONCE A DAY
Status: ACTIVE | COMMUNITY

## 2023-11-28 RX ORDER — NADOLOL 20 MG/1
1 TABLET TABLET ORAL BID
COMMUNITY

## 2023-11-28 NOTE — HPI-TODAY'S VISIT:
Patient is  a 68 yo white male last seen May 2023 by MS Layton and who presents for follow up of cirrhosis with ascites due to fatty liver and alcohol.  He was originally referred by Dr. Borges and a copy will be sent to the referring.   11/28/23 ov 11/22/23 labs with rbc 2.7, hemoglobin 8.6, mcv 96, [id number], creatinine 0.75, sodium 137, potassium 4.2, calcium low at 83, albumin low 3.3,bilirubin 2.2, alkaline phosphatase 122, ast 39, alt 16. inr 1.4MELD 13  EGD 11/13/23 grade 1 varices with no bleeding middle third esophagus and lower thrid. portal gastropathy in stomach  Dear Alfredito Calhoun,  The 11/15/23 mri was sent to me.  The liver with morphologic changes of chronic liver disease but they did not see any lesions.  Fat quantification was 4%.  They did not see any significant iron.  The gallbladder biliary tree appeared normal.  The spleen top normal in size at 12 cm.  The pancreas, adrenal glands normal.  They saw a renal cyst, please share this with the primary care.  They saw small paraesophageal perisplenic and perigastric varices as well as a recanalized umbilical vein.  They noted that the portal vasculature was patent. Need to be sure to stay on top of the EGDs.  They also saw ascites which stated was worse than last time and really need to make sure you watch the salt.  Overall they saw cirrhosis and evidence of portal hypertension with worsening ascites and we will review at your upcoming visit.  Flor Layton PA-C  He recently had norovirus as well need to check on raman hgb again  given ascites need to increase the aldactone to 50 mg and inc lasix 40 mg      8/22/23 ov  August 4, 2023 labs with hemoglobin A1c 5.8, INR 1.4 Complete blood count with white blood cells 4.5, red blood cells 3.5, hemoglobin 12.3, , platelets 82.  The sodium 137, potassium 4.1, creatinine 0.57, bilirubin 2.6, alkaline phosphatase 128, AST 60, ALT 23.  Ferritin 33. MELD 14 due for egd and needs to schedule this, reminded last visit  ETOH previously went 55 days without a dink  then went to travel and hositng and just got back from 10 [place] in [place] and suspect this is affecting the labs  MRI due in [facility]  5/15/23 visit The May 13 MRI was sent to me. The liver calculated fat percentage is 4.2% which is not consistent with fatty liver.  They did see morphologic changes of chronic liver disease including lobar redistribution and nodular contour.  They also saw delayed reticular enhancement of the hepatic parenchyma which is compatible with fibrosis.  They also saw a few areas of perfusion anomalies throughout the liver but they did not see any suspicious lesions.  The hepatic vasculature is patent.  They saw paraesophageal, perigastric, and perisplenic varices and noted a recannulized umbilical vein.  The gallbladder and biliary tree appeared normal.  Spleen enlarged at 15.2 cm.  They thought the pancreas and adrenal glands were normal.  The kidneys with simple left renal cysts.  They saw colonic diverticula.  They saw minimal atherosclerotic disease without aortic aneurysm.  Please share this with the primary care.  They did see trace ascites and it is important that she stay on a low-salt diet.  They saw mild degenerative changes of the spine.  Overall the impression was that they saw morphological changes of chronic liver disease including portal hypertension and varices.  This is very important for you to stay on top of the endoscopies. We will review this at the follow-up. MELD 12  Creatinine 0.64, sodium 142, k 4.4, bilirubin 1.7, alp 156, ast 67, alt 16. rbc 4.06, mcv 100, platelets 69, inr 1.4 Discussed this and good to see less fat but need to monitor the labs   recap Spoke with the patient about his labs today 2/6/23 labs with rbc low, he will share this with his pcp. THe bilirubin as not improve and the alp, ast, and alt all back up and prior tehy were imprroving as he had stopped the alcohol and now back up and when asked he had been drinking again. He will be out of the country for 20 days and leaving tomorrow. Recommend he check labs while away. He has ordes to do when he gets back. Discussed the risk of continued ETOH use and er precautions  3/17/23/The white blood cells 5.9 red blood cells 3.75 and this is low please share this with your primary care.  MCV 97, platelets 77 and this is low.  The glucose 117, creatinine 0.69, sodium 142, potassium 3.9, bilirubin 2.3, alkaline phosphatase 150, AST 70, ALT 17.  Previously the bilirubin was 2.2,  Alkaline phosphatase was 168, AST 95 and ALT 27.  Some of these have improved.  INR is 1.4.  As below had a trip recently and did still consume etoh. He had mainly at dinner so did not stop. weight stable.   recap 1/30/23 December 12 labs showed that your hep B surface antibody strongly positive at 631.  Good to see it is present and important to note that its come down a little bit from last year and it was 665.9 to the current 631. Hepatitis A IgM was negative and hep A immunity was seen so this would indicate that both hep a and B vaccines are still providing immunity for you. Your INR was up to 1.3 from previous 1.1 back in February.   Glucose was about the same and 112 from previous 111 in February BUN was 13 creatinine 0.64 sodium 138 potassium 4.1 albumin was 3.9. Your total globulin was slightly up at 4.1. Your AST was higher at 159 from 64 which suggests that your liver is more inflamed and your ALT is likewise higher at 33 from 18 and you need to work on the continued alcohol use issues as we discussed.   Your alkaline phosphatase was 174 also higher at 134. Your total bilirubin doubled from 1.0 to 2.0 and some concern about these labs and they are increased.  You really need to work on this issue. White blood cell count was 6.3 hemoglobin 13 platelet count was 98 and previously your plate count was 135,000 so that has gone down.  Your hemoglobin also has come down from 13.2 to 13. RBC count was diminished at 3.84 and your MCV was 96.6.  RDW was elevated at 16.1.  Please share these labs with your primary provider. Neutrophils were 4536 and lymphocytes were slightly low at 838.  These were previously normal back in February. Meld 12 and meld na 12. Prior had been around a 7 so that went up. Am concerned with the rising bili and labs and once see the tb start to rise with this,  it could go into a much higher crisis level and that needs to be followed for. I would recommend that you redo the labs in a month and do an in person or telemed visit here to see if the labs are better.  Please work on this issues as you can clearly see the labs are worse.  1/24/23 labs: cbc : rbc low  mcv 99, platelets 88,  glucose 168, cr 0.76, bilirubin 2.2, alk phos 125, ast 62, alt 18 1.4 MELD 13 peth pending Has reduced etoh and went 1 week in early january and none since 19th on jan discussed the bilirubin and has had a cold and could have impacted it   Patient will be out of the country feb 11-march 2 due to vacation viking cruise and he will do this. Discussed diet while on the cruise and encouraged him on the weight loss prior he was 208 and now 194 and he will stay off the etoh and work on diet and we will monitor this  EGD due in may   recap Aug 10 ultrasound shows liver with increased echogenicity and no lesions. No dilated bile ducts seen. Gallbladder decompressed with mild wall thickening.  Gracia sign is negative. Pancreas visualized portions unremarkable. Right kidney and left kidney 13 cm with no hydronephrosis. Spleen slightly enlarged at 13.8 cm. Liver vessels were not well evaluated due to the increased echogenicity. Trace ascites was seen. Overall the liver appeared to be fatty with the hepatic vasculature is not well seen due to poor penetration.  Splenomegaly was noted.  Trace ascites was seen.  Important to stay on a low-salt diet. I believe that we may be able to use this ultrasound as a justification to do a more advanced scan such as an MRI next time.  Your February 2022 MRI did not show any concerning lesions but did have the perfusion anomalies and I believe this scan was done as the MRI was declined.  Given the limitations seen of the ultrasound we will be able to then order the MRI for the next scan.  [place] notified insurance denied mri so jovanna do u.s and as expect if they cannot see perfusion changes can then ask for mri then.   May 27 egd sent by Dr Borges. Grade 1 esophageal varices seen in middle third esophagus and lower third of esophagis. Mild portal hypertensive gastropathy seen in stomach. No varices in stomach. Duodenum normal. Good to see no banding needed. Plan for redo in 1 yr.  Says weight is down a little. Prior BMI 43 and needs to work on this given fatty liver and says heart team cleared to exercise more. He says 3 weeks ago at primary 8 pounds.  s/p EGD 12/17/21- with dr. borges showing grade II EV, s/p 3 bands in distal esophagus, moderate portal hypertensive gastropathy, no gastric varices, no active bleeding.     February 5, 2022 MRI Lower thorax was normal. Liver showed fat deposition by fat fraction of 15 to 20% and desired is less than 6.4%. They do see chronic liver disease changes including lobar redistribution and nodular contour.   Perfusion anomalies seen in the liver which usually lead to a 6-month surveillance for this. Periesophageal, perigastric, and perisplenic varices seen as well as recanalized umbilical vein although but suggestive of portal hypertension is present. Spleen is 13 cm. Pancreas was normal. Kidneys show simple left renal cyst. Colon shows signs of diverticulosis but without inflammation.  Important for you to be on a high-fiber diet with this and make sure that your primary provider is aware of same. Atherosclerosis seen of the aorta without aneurysm.  Please share with primary provider as well. May want to check lipid levels. Degenerative changes seen of the spine.  Labs 1/14/22- afp 5.2 normal, glucose 98 normal, creatinine 0.66 slightly low (prev 0.65), sodium 141 normal, tbili 0.7 normal, alk phos 121 (prev 123), ast 77 high (prev 81), alt 34 normal (prev 23) hemoglobin 11.9 low (prev 10.6), wbc 6.6 normal, platelets 196 normal, rdw 17.6 high and  monocytes 1.0 high (please share with primary md), inr 1.1 normal, MELD 7, MELD-NA 7.  He was evaluated at [facility] on 10/7/21-10/11/21 for hgb 6.8 (also on eliquis)- admitted and given 2 units blood, hgb up to 9.1. Not enough fluid to tap per US.  No scopes performed.    He notes 3 days of small volume hematemesis in middle of Sept.  Wasn't eating much at the time.   He noted systolic BP <100 on several occasions since hospitalization.  They stopped carvedilol.   He was told he had fatty liver 2 years ago. states 'stomach getting bigger' in april 2021 timeframe. per pt was drinking more after jan 2021 and then stopped on may 15, 2021 when he was hospitalized.  Has since started drinking again.    Drinking 2 glasses of wine per day at time of Oct 2021 office visit.  Previously drinking 3-4 drinks daily.   Encouraged absolute alcohol cessation and rehab/therapy.   alk phos 127 (prev 129), ast 48 (prev 53), alt 14 (prev 16), MELD 7 on 10/6/21 labs.  Weight 237 11/2020.  205 1/2021 after stomach flu, 225 prior to para on 5/16/21.  Weight 216 in June 2021.  Weight 212 August.  EGD 5/20/21- 2 cords of small nonbleeding grade 1-2 esophgeal varices in the distal esophagus.  Most recent MRI 6/26/21 showing trace ascites (prior moderate on CT in May 2021)- on lasix 20 mg, spironolactone 12.5 mg/day and doing well with this.  Eating less salt but could do better with this.  No HSE, no signs of GI bleeding, no abdominal pain, jaundice, pruritus, fevers, chills, increased abdominal distention.  Was recommended to go to ED after 10/6/21 labs showing hgb 6.8.    July 26 MRI- Lower thorax was normal.  Liver showed some fat deposition as well as changes of chronic liver disease including lobar redistribution and nodular contour.  They also see some delayed particular enhancement of the liver which would be compatible with fibrosis. They see perfusion changes throughout the liver but no suspicious lesions.   Typically we redo an MRI in 6 months to follow these perfusion anomalies. They see varices near the esophagus, stomach and spleen area.  Some of the paraesophageal varices are submucosal.  So this is important to follow with egd as you are doing. The gallbladder and biliary tree was normal.   The spleen was 13.1 cm which many would consider slightly enlarged.  Pancreas was normal.  Simple left renal cyst was seen but no size given.  Some nonspecific lymph nodes were seen near the liver. They feel that those lymph nodes are likely reactive. You do have trace ascites so would be very important for you to remain on a low-salt diet and on your diuretics that you are on.  Low-salt diet is really the basis for the ascites control and then the diuretics help out more if you are already on that type of diet. Some mild degenerative changes were seen of the spine. Overall we would recommend repeat the MRI in 6 months. Important to note that on the previous CT from May of this year you had a cirrhotic appearance of liver but moderate intra-abdominal ascites that as well as the probable varices as well. The EGD was done on May 20 of this year and it showed 2 cords of small grade 1-2 esophageal varices that were seen.  hep c negative, iron sat low 7%-follow up with pcp or gi for this. ceruloplasmin 32. ferritin low 24. asma neg. ama neg. alpha 1 MM. shavonne neg. ast 34, alt 9. hgb low 9.8-need to see gi or pcp. plt 239. monocytes 1200.   CT triple phase 5/21/21 showed no definitive evidence of HCC, no enhancing liver masses, cirrhotic appearance of the liver with small to moderate intra-abdominal ascites and probable upper abdominal varices.  Esophageal and gastric varices may be present, patent portal veins.   Paracentesis 5/20/21 with 8.2L of fluid removed- path with numerous meseothelial cells and histiocytes, no atypical or malignant cells present.  Colonoscopy 5/20/21- severe left sided diverticulosis, tortuous left colon, 4 mm sessile sigmoid colon polyp removed, small nonbleeding internal hemorrhoids, limited exam beause of suboptimal prep, repeat 3 years.  EGD 5/20/21- 2 cords of small nonbleeding grade 1-2 esophgeal varices in the distal esophagus.    U/S doppler 5/19/21- hepatopetal flow of the liver. Pt here for follow up.  echo showed EF 55-60%

## 2023-12-01 ENCOUNTER — TELEPHONE ENCOUNTER (OUTPATIENT)
Dept: URBAN - METROPOLITAN AREA CLINIC 86 | Facility: CLINIC | Age: 68
End: 2023-12-01

## 2023-12-01 NOTE — HPI-TODAY'S VISIT:
Dear Alfredito Geronimo, The recent paracentesis was sent to me. They took off  5 L of fluid. Very important you stop alcohol and do the low salt diet. The fluid studies are pending but did see partials and they noted the appearance was somewhat cloudy and they did see some white blood cells or lymphocytes within the fluid.  We await the rest of the fluid studies to get more details on this. Flor Layton PA-C

## 2023-12-04 ENCOUNTER — TELEPHONE ENCOUNTER (OUTPATIENT)
Dept: URBAN - METROPOLITAN AREA CLINIC 92 | Facility: CLINIC | Age: 68
End: 2023-12-04

## 2023-12-04 RX ORDER — FUROSEMIDE 40 MG/1
1 TABLET TABLET ORAL ONCE A DAY
Qty: 30 TABLET | Refills: 2

## 2023-12-04 RX ORDER — SPIRONOLACTONE 50 MG/1
1 TABLET TABLET, FILM COATED ORAL ONCE A DAY
Qty: 30 TABLET | Refills: 2
End: 2024-03-03

## 2023-12-05 ENCOUNTER — TELEPHONE ENCOUNTER (OUTPATIENT)
Dept: URBAN - METROPOLITAN AREA CLINIC 86 | Facility: CLINIC | Age: 68
End: 2023-12-05

## 2023-12-12 ENCOUNTER — LAB OUTSIDE AN ENCOUNTER (OUTPATIENT)
Dept: URBAN - METROPOLITAN AREA CLINIC 86 | Facility: CLINIC | Age: 68
End: 2023-12-12

## 2024-01-05 ENCOUNTER — LAB OUTSIDE AN ENCOUNTER (OUTPATIENT)
Dept: URBAN - METROPOLITAN AREA CLINIC 86 | Facility: CLINIC | Age: 69
End: 2024-01-05

## 2024-01-05 ENCOUNTER — TELEPHONE ENCOUNTER (OUTPATIENT)
Dept: URBAN - METROPOLITAN AREA CLINIC 86 | Facility: CLINIC | Age: 69
End: 2024-01-05

## 2024-01-05 ENCOUNTER — OFFICE VISIT (OUTPATIENT)
Dept: URBAN - METROPOLITAN AREA CLINIC 86 | Facility: CLINIC | Age: 69
End: 2024-01-05
Payer: MEDICARE

## 2024-01-05 VITALS
SYSTOLIC BLOOD PRESSURE: 107 MMHG | WEIGHT: 208 LBS | HEART RATE: 76 BPM | TEMPERATURE: 97.1 F | BODY MASS INDEX: 31.52 KG/M2 | HEIGHT: 68 IN | DIASTOLIC BLOOD PRESSURE: 64 MMHG

## 2024-01-05 DIAGNOSIS — I85.00 ESOPHAGEAL VARICES DETERMINED BY ENDOSCOPY: ICD-10-CM

## 2024-01-05 DIAGNOSIS — F10.20 ALCOHOL DEPENDENCE: ICD-10-CM

## 2024-01-05 DIAGNOSIS — Z79.899 HIGH RISK MEDICATION USE: ICD-10-CM

## 2024-01-05 DIAGNOSIS — K74.60 UNSPECIFIED CIRRHOSIS OF LIVER: ICD-10-CM

## 2024-01-05 DIAGNOSIS — K76.0 FATTY LIVER: ICD-10-CM

## 2024-01-05 DIAGNOSIS — R74.8 ABNORMAL LIVER ENZYMES: ICD-10-CM

## 2024-01-05 DIAGNOSIS — R93.2 ABNORMAL LIVER DIAGNOSTIC IMAGING: ICD-10-CM

## 2024-01-05 DIAGNOSIS — K76.89 LIVER LESION: ICD-10-CM

## 2024-01-05 DIAGNOSIS — R18.8 OTHER ASCITES: ICD-10-CM

## 2024-01-05 DIAGNOSIS — Z71.89 VACCINE COUNSELING: ICD-10-CM

## 2024-01-05 DIAGNOSIS — E66.01 MORBID (SEVERE) OBESITY DUE TO EXCESS CALORIES: ICD-10-CM

## 2024-01-05 PROCEDURE — 99214 OFFICE O/P EST MOD 30 MIN: CPT | Performed by: PHYSICIAN ASSISTANT

## 2024-01-05 RX ORDER — THIAMINE HCL 100 MG
1 TABLET TABLET ORAL ONCE A DAY
Status: ACTIVE | COMMUNITY

## 2024-01-05 RX ORDER — FOLIC ACID 0.8 MG
1 TABLET TABLET ORAL ONCE A DAY
Status: ACTIVE | COMMUNITY

## 2024-01-05 RX ORDER — NADOLOL 20 MG/1
1 TABLET TABLET ORAL BID
COMMUNITY

## 2024-01-05 RX ORDER — LEVOTHYROXINE SODIUM 125 UG/1
1 TABLET IN THE MORNING ON AN EMPTY STOMACH TABLET ORAL ONCE A DAY
Status: ACTIVE | COMMUNITY

## 2024-01-05 RX ORDER — METRONIDAZOLE 7.5 MG/G
1 APPLICATION CREAM TOPICAL TWICE A DAY
Status: ACTIVE | COMMUNITY

## 2024-01-05 RX ORDER — SPIRONOLACTONE 50 MG/1
1 TABLET TABLET, FILM COATED ORAL ONCE A DAY
Qty: 30 TABLET | Refills: 2 | Status: ACTIVE | COMMUNITY
End: 2024-03-03

## 2024-01-05 RX ORDER — OMEGA-3/DHA/EPA/FISH OIL 120-180 MG
1 TABLET CAPSULE ORAL ONCE A DAY
Status: ACTIVE | COMMUNITY

## 2024-01-05 RX ORDER — FUROSEMIDE 40 MG/1
1 TABLET TABLET ORAL ONCE A DAY
Qty: 30 TABLET | Refills: 2 | Status: ACTIVE | COMMUNITY

## 2024-01-05 NOTE — HPI-TODAY'S VISIT:
Patient is  a 66 yo white male last seen May 2023 by MS Layton and who presents for follow up of cirrhosis with ascites due to fatty liver and alcohol.  He was originally referred by Dr. Borges and a copy will be sent to the referring provider  1/4/24 ov  At last visit was having worsening ascites  had to order paracentesis 11/30/23 paracentesis with 5400 mL removed has not had paracentesis since then states needs one now asked about etoh and about 4 glasses in 3 months.  discussed need labs today and if cr ok go up to 100 mg on aldactone  if still needing taps will need echo   11/28/23 ov 11/22/23 labs with rbc 2.7, hemoglobin 8.6, mcv 96, [id number], creatinine 0.75, sodium 137, potassium 4.2, calcium low at 83, albumin low 3.3,bilirubin 2.2, alkaline phosphatase 122, ast 39, alt 16. inr 1.4MELD 13  EGD 11/13/23 grade 1 varices with no bleeding middle third esophagus and lower thrid. portal gastropathy in stomach  Dear Alfredito Calhoun,  The 11/15/23 mri was sent to me.  The liver with morphologic changes of chronic liver disease but they did not see any lesions.  Fat quantification was 4%.  They did not see any significant iron.  The gallbladder biliary tree appeared normal.  The spleen top normal in size at 12 cm.  The pancreas, adrenal glands normal.  They saw a renal cyst, please share this with the primary care.  They saw small paraesophageal perisplenic and perigastric varices as well as a recanalized umbilical vein.  They noted that the portal vasculature was patent. Need to be sure to stay on top of the EGDs.  They also saw ascites which stated was worse than last time and really need to make sure you watch the salt.  Overall they saw cirrhosis and evidence of portal hypertension with worsening ascites and we will review at your upcoming visit.  Flor Layton PA-C  He recently had norovirus as well need to check on raman hgb again  given ascites need to increase the aldactone to 50 mg and inc lasix 40 mg      8/22/23 ov  August 4, 2023 labs with hemoglobin A1c 5.8, INR 1.4 Complete blood count with white blood cells 4.5, red blood cells 3.5, hemoglobin 12.3, , platelets 82.  The sodium 137, potassium 4.1, creatinine 0.57, bilirubin 2.6, alkaline phosphatase 128, AST 60, ALT 23.  Ferritin 33. MELD 14 due for egd and needs to schedule this, reminded last visit  ETOH previously went 55 days without a dink  then went to travel and hositng and just got back from 10 [place] in [place] and suspect this is affecting the labs  MRI due in [facility]  5/15/23 visit The May 13 MRI was sent to me. The liver calculated fat percentage is 4.2% which is not consistent with fatty liver.  They did see morphologic changes of chronic liver disease including lobar redistribution and nodular contour.  They also saw delayed reticular enhancement of the hepatic parenchyma which is compatible with fibrosis.  They also saw a few areas of perfusion anomalies throughout the liver but they did not see any suspicious lesions.  The hepatic vasculature is patent.  They saw paraesophageal, perigastric, and perisplenic varices and noted a recannulized umbilical vein.  The gallbladder and biliary tree appeared normal.  Spleen enlarged at 15.2 cm.  They thought the pancreas and adrenal glands were normal.  The kidneys with simple left renal cysts.  They saw colonic diverticula.  They saw minimal atherosclerotic disease without aortic aneurysm.  Please share this with the primary care.  They did see trace ascites and it is important that she stay on a low-salt diet.  They saw mild degenerative changes of the spine.  Overall the impression was that they saw morphological changes of chronic liver disease including portal hypertension and varices.  This is very important for you to stay on top of the endoscopies. We will review this at the follow-up. MELD 12  Creatinine 0.64, sodium 142, k 4.4, bilirubin 1.7, alp 156, ast 67, alt 16. rbc 4.06, mcv 100, platelets 69, inr 1.4 Discussed this and good to see less fat but need to monitor the labs   recap Spoke with the patient about his labs today 2/6/23 labs with rbc low, he will share this with his pcp. THe bilirubin as not improve and the alp, ast, and alt all back up and prior tehy were imprroving as he had stopped the alcohol and now back up and when asked he had been drinking again. He will be out of the country for 20 days and leaving tomorrow. Recommend he check labs while away. He has ordes to do when he gets back. Discussed the risk of continued ETOH use and er precautions  3/17/23/The white blood cells 5.9 red blood cells 3.75 and this is low please share this with your primary care.  MCV 97, platelets 77 and this is low.  The glucose 117, creatinine 0.69, sodium 142, potassium 3.9, bilirubin 2.3, alkaline phosphatase 150, AST 70, ALT 17.  Previously the bilirubin was 2.2,  Alkaline phosphatase was 168, AST 95 and ALT 27.  Some of these have improved.  INR is 1.4.  As below had a trip recently and did still consume etoh. He had mainly at dinner so did not stop. weight stable.   recap 1/30/23 December 12 labs showed that your hep B surface antibody strongly positive at 631.  Good to see it is present and important to note that its come down a little bit from last year and it was 665.9 to the current 631. Hepatitis A IgM was negative and hep A immunity was seen so this would indicate that both hep a and B vaccines are still providing immunity for you. Your INR was up to 1.3 from previous 1.1 back in February.   Glucose was about the same and 112 from previous 111 in February BUN was 13 creatinine 0.64 sodium 138 potassium 4.1 albumin was 3.9. Your total globulin was slightly up at 4.1. Your AST was higher at 159 from 64 which suggests that your liver is more inflamed and your ALT is likewise higher at 33 from 18 and you need to work on the continued alcohol use issues as we discussed.   Your alkaline phosphatase was 174 also higher at 134. Your total bilirubin doubled from 1.0 to 2.0 and some concern about these labs and they are increased.  You really need to work on this issue. White blood cell count was 6.3 hemoglobin 13 platelet count was 98 and previously your plate count was 135,000 so that has gone down.  Your hemoglobin also has come down from 13.2 to 13. RBC count was diminished at 3.84 and your MCV was 96.6.  RDW was elevated at 16.1.  Please share these labs with your primary provider. Neutrophils were 4536 and lymphocytes were slightly low at 838.  These were previously normal back in February. Meld 12 and meld na 12. Prior had been around a 7 so that went up. Am concerned with the rising bili and labs and once see the tb start to rise with this,  it could go into a much higher crisis level and that needs to be followed for. I would recommend that you redo the labs in a month and do an in person or telemed visit here to see if the labs are better.  Please work on this issues as you can clearly see the labs are worse.  1/24/23 labs: cbc : rbc low  mcv 99, platelets 88,  glucose 168, cr 0.76, bilirubin 2.2, alk phos 125, ast 62, alt 18 1.4 MELD 13 peth pending Has reduced etoh and went 1 week in early january and none since 19th on jan discussed the bilirubin and has had a cold and could have impacted it   Patient will be out of the country feb 11-march 2 due to vacation viking cruise and he will do this. Discussed diet while on the cruise and encouraged him on the weight loss prior he was 208 and now 194 and he will stay off the etoh and work on diet and we will monitor this  EGD due in may   recap Aug 10 ultrasound shows liver with increased echogenicity and no lesions. No dilated bile ducts seen. Gallbladder decompressed with mild wall thickening.  Gracia sign is negative. Pancreas visualized portions unremarkable. Right kidney and left kidney 13 cm with no hydronephrosis. Spleen slightly enlarged at 13.8 cm. Liver vessels were not well evaluated due to the increased echogenicity. Trace ascites was seen. Overall the liver appeared to be fatty with the hepatic vasculature is not well seen due to poor penetration.  Splenomegaly was noted.  Trace ascites was seen.  Important to stay on a low-salt diet. I believe that we may be able to use this ultrasound as a justification to do a more advanced scan such as an MRI next time.  Your February 2022 MRI did not show any concerning lesions but did have the perfusion anomalies and I believe this scan was done as the MRI was declined.  Given the limitations seen of the ultrasound we will be able to then order the MRI for the next scan.  [place] notified insurance denied mri so jovanna do u.s and as expect if they cannot see perfusion changes can then ask for mri then.   May 27 egd sent by Dr Borges. Grade 1 esophageal varices seen in middle third esophagus and lower third of esophagis. Mild portal hypertensive gastropathy seen in stomach. No varices in stomach. Duodenum normal. Good to see no banding needed. Plan for redo in 1 yr.  Says weight is down a little. Prior BMI 43 and needs to work on this given fatty liver and says heart team cleared to exercise more. He says 3 weeks ago at primary 8 pounds.  s/p EGD 12/17/21- with dr. borges showing grade II EV, s/p 3 bands in distal esophagus, moderate portal hypertensive gastropathy, no gastric varices, no active bleeding.     February 5, 2022 MRI Lower thorax was normal. Liver showed fat deposition by fat fraction of 15 to 20% and desired is less than 6.4%. They do see chronic liver disease changes including lobar redistribution and nodular contour.   Perfusion anomalies seen in the liver which usually lead to a 6-month surveillance for this. Periesophageal, perigastric, and perisplenic varices seen as well as recanalized umbilical vein although but suggestive of portal hypertension is present. Spleen is 13 cm. Pancreas was normal. Kidneys show simple left renal cyst. Colon shows signs of diverticulosis but without inflammation.  Important for you to be on a high-fiber diet with this and make sure that your primary provider is aware of same. Atherosclerosis seen of the aorta without aneurysm.  Please share with primary provider as well. May want to check lipid levels. Degenerative changes seen of the spine.  Labs 1/14/22- afp 5.2 normal, glucose 98 normal, creatinine 0.66 slightly low (prev 0.65), sodium 141 normal, tbili 0.7 normal, alk phos 121 (prev 123), ast 77 high (prev 81), alt 34 normal (prev 23) hemoglobin 11.9 low (prev 10.6), wbc 6.6 normal, platelets 196 normal, rdw 17.6 high and  monocytes 1.0 high (please share with primary md), inr 1.1 normal, MELD 7, MELD-NA 7.  He was evaluated at [facility] on 10/7/21-10/11/21 for hgb 6.8 (also on eliquis)- admitted and given 2 units blood, hgb up to 9.1. Not enough fluid to tap per US.  No scopes performed.    He notes 3 days of small volume hematemesis in middle of Sept.  Wasn't eating much at the time.   He noted systolic BP <100 on several occasions since hospitalization.  They stopped carvedilol.   He was told he had fatty liver 2 years ago. states 'stomach getting bigger' in april 2021 timeframe. per pt was drinking more after jan 2021 and then stopped on may 15, 2021 when he was hospitalized.  Has since started drinking again.    Drinking 2 glasses of wine per day at time of Oct 2021 office visit.  Previously drinking 3-4 drinks daily.   Encouraged absolute alcohol cessation and rehab/therapy.   alk phos 127 (prev 129), ast 48 (prev 53), alt 14 (prev 16), MELD 7 on 10/6/21 labs.  Weight 237 11/2020.  205 1/2021 after stomach flu, 225 prior to para on 5/16/21.  Weight 216 in June 2021.  Weight 212 August.  EGD 5/20/21- 2 cords of small nonbleeding grade 1-2 esophgeal varices in the distal esophagus.  Most recent MRI 6/26/21 showing trace ascites (prior moderate on CT in May 2021)- on lasix 20 mg, spironolactone 12.5 mg/day and doing well with this.  Eating less salt but could do better with this.  No HSE, no signs of GI bleeding, no abdominal pain, jaundice, pruritus, fevers, chills, increased abdominal distention.  Was recommended to go to ED after 10/6/21 labs showing hgb 6.8.    July 26 MRI- Lower thorax was normal.  Liver showed some fat deposition as well as changes of chronic liver disease including lobar redistribution and nodular contour.  They also see some delayed particular enhancement of the liver which would be compatible with fibrosis. They see perfusion changes throughout the liver but no suspicious lesions.   Typically we redo an MRI in 6 months to follow these perfusion anomalies. They see varices near the esophagus, stomach and spleen area.  Some of the paraesophageal varices are submucosal.  So this is important to follow with egd as you are doing. The gallbladder and biliary tree was normal.   The spleen was 13.1 cm which many would consider slightly enlarged.  Pancreas was normal.  Simple left renal cyst was seen but no size given.  Some nonspecific lymph nodes were seen near the liver. They feel that those lymph nodes are likely reactive. You do have trace ascites so would be very important for you to remain on a low-salt diet and on your diuretics that you are on.  Low-salt diet is really the basis for the ascites control and then the diuretics help out more if you are already on that type of diet. Some mild degenerative changes were seen of the spine. Overall we would recommend repeat the MRI in 6 months. Important to note that on the previous CT from May of this year you had a cirrhotic appearance of liver but moderate intra-abdominal ascites that as well as the probable varices as well. The EGD was done on May 20 of this year and it showed 2 cords of small grade 1-2 esophageal varices that were seen.  hep c negative, iron sat low 7%-follow up with pcp or gi for this. ceruloplasmin 32. ferritin low 24. asma neg. ama neg. alpha 1 MM. shavonne neg. ast 34, alt 9. hgb low 9.8-need to see gi or pcp. plt 239. monocytes 1200.   CT triple phase 5/21/21 showed no definitive evidence of HCC, no enhancing liver masses, cirrhotic appearance of the liver with small to moderate intra-abdominal ascites and probable upper abdominal varices.  Esophageal and gastric varices may be present, patent portal veins.   Paracentesis 5/20/21 with 8.2L of fluid removed- path with numerous meseothelial cells and histiocytes, no atypical or malignant cells present.  Colonoscopy 5/20/21- severe left sided diverticulosis, tortuous left colon, 4 mm sessile sigmoid colon polyp removed, small nonbleeding internal hemorrhoids, limited exam beause of suboptimal prep, repeat 3 years.  EGD 5/20/21- 2 cords of small nonbleeding grade 1-2 esophgeal varices in the distal esophagus.    U/S doppler 5/19/21- hepatopetal flow of the liver. Pt here for follow up.  echo showed EF 55-60%

## 2024-01-12 ENCOUNTER — TELEPHONE ENCOUNTER (OUTPATIENT)
Dept: URBAN - METROPOLITAN AREA CLINIC 86 | Facility: CLINIC | Age: 69
End: 2024-01-12

## 2024-01-12 LAB
A/G RATIO: 0.9
ALBUMIN: 3
ALKALINE PHOSPHATASE: 112
ALT (SGPT): 13
AST (SGOT): 34
BASO (ABSOLUTE): 0
BASOS: 1
BILIRUBIN, TOTAL: 2
BUN/CREATININE RATIO: 15
BUN: 12
CALCIUM: 8.4
CARBON DIOXIDE, TOTAL: 24
CHLORIDE: 102
CREATININE: 0.78
EGFR: 97
EOS (ABSOLUTE): 0.1
EOS: 2
GLOBULIN, TOTAL: 3.2
GLUCOSE: 110
HEMATOCRIT: 32.6
HEMATOLOGY COMMENTS:: (no result)
HEMOGLOBIN: 10.7
IMMATURE CELLS: (no result)
IMMATURE GRANS (ABS): 0
IMMATURE GRANULOCYTES: 0
INR: 1.5
LYMPHS (ABSOLUTE): 0.8
LYMPHS: 20
MCH: 27.9
MCHC: 32.8
MCV: 85
MONOCYTES(ABSOLUTE): 0.5
MONOCYTES: 13
NEUTROPHILS (ABSOLUTE): 2.6
NEUTROPHILS: 64
NRBC: (no result)
PLATELETS: 123
POTASSIUM: 4
PROTEIN, TOTAL: 6.2
PROTHROMBIN TIME: 15
RBC: 3.83
RDW: 19.2
SODIUM: 138
WBC: 4.1

## 2024-01-12 RX ORDER — SPIRONOLACTONE 100 MG/1
1 TABLET TABLET, FILM COATED ORAL ONCE A DAY
Qty: 30 TABLET | Refills: 2
End: 2024-04-11

## 2024-01-12 NOTE — HPI-TODAY'S VISIT:
Dear Alfredito Geronimo,  The 1/11/24 labs were sent to me.  The the white blood cells 4.1, hemoglobin 10.7 and this is low we will continue to monitor this.  Higher than previously back in November was 8.6.  The MCV 85, platelets 123 and this is low and continue to monitor this as well.  Glucose 110, creatinine 0.78 which is normal.  The sodium and potassium are normal at 138 and 4.0 respectively.  Calcium slightly low and recommend sharing that with your primary care.  Bilirubin 2.0, alkaline phosphatase 112, AST 34, ALT 13.  Goal for the AST and ALT is less than 35.  Previously on December 12 the bilirubin 2.4, alkaline phosphatase 86, AST 34 and ALT of 13.  Given your creatinine is staying stable I am going to increase the Aldactone to 100 mg and send this to the pharmacy.  I will have the office call and notify you of this. Flor Layton PA-C

## 2024-01-19 ENCOUNTER — LAB OUTSIDE AN ENCOUNTER (OUTPATIENT)
Dept: URBAN - METROPOLITAN AREA CLINIC 86 | Facility: CLINIC | Age: 69
End: 2024-01-19

## 2024-01-25 ENCOUNTER — TELEPHONE ENCOUNTER (OUTPATIENT)
Dept: URBAN - METROPOLITAN AREA CLINIC 86 | Facility: CLINIC | Age: 69
End: 2024-01-25

## 2024-01-25 ENCOUNTER — OFFICE VISIT (OUTPATIENT)
Dept: URBAN - METROPOLITAN AREA TELEHEALTH 2 | Facility: TELEHEALTH | Age: 69
End: 2024-01-25
Payer: MEDICARE

## 2024-01-25 VITALS — WEIGHT: 186 LBS | BODY MASS INDEX: 28.19 KG/M2 | HEIGHT: 68 IN

## 2024-01-25 DIAGNOSIS — I85.00 ESOPHAGEAL VARICES DETERMINED BY ENDOSCOPY: ICD-10-CM

## 2024-01-25 DIAGNOSIS — K74.60 CIRRHOSIS OF LIVER: ICD-10-CM

## 2024-01-25 DIAGNOSIS — K76.0 FATTY LIVER: ICD-10-CM

## 2024-01-25 DIAGNOSIS — R18.8 OTHER ASCITES: ICD-10-CM

## 2024-01-25 PROCEDURE — 99214 OFFICE O/P EST MOD 30 MIN: CPT | Performed by: PHYSICIAN ASSISTANT

## 2024-01-25 RX ORDER — METRONIDAZOLE 7.5 MG/G
1 APPLICATION CREAM TOPICAL TWICE A DAY
Status: ACTIVE | COMMUNITY

## 2024-01-25 RX ORDER — NADOLOL 20 MG/1
1 TABLET TABLET ORAL BID
COMMUNITY

## 2024-01-25 RX ORDER — FUROSEMIDE 40 MG/1
1 TABLET TABLET ORAL ONCE A DAY
Qty: 30 TABLET | Refills: 2 | Status: ACTIVE | COMMUNITY

## 2024-01-25 RX ORDER — SPIRONOLACTONE 100 MG/1
1 TABLET TABLET, FILM COATED ORAL ONCE A DAY
Qty: 30 TABLET | Refills: 2 | Status: ACTIVE | COMMUNITY
End: 2024-04-11

## 2024-01-25 RX ORDER — LEVOTHYROXINE SODIUM 125 UG/1
1 TABLET IN THE MORNING ON AN EMPTY STOMACH TABLET ORAL ONCE A DAY
Status: ACTIVE | COMMUNITY

## 2024-01-25 RX ORDER — OMEGA-3/DHA/EPA/FISH OIL 120-180 MG
1 TABLET CAPSULE ORAL ONCE A DAY
Status: ACTIVE | COMMUNITY

## 2024-01-25 RX ORDER — THIAMINE HCL 100 MG
1 TABLET TABLET ORAL ONCE A DAY
Status: ACTIVE | COMMUNITY

## 2024-01-25 RX ORDER — FOLIC ACID 0.8 MG
1 TABLET TABLET ORAL ONCE A DAY
Status: ACTIVE | COMMUNITY

## 2024-01-25 NOTE — HPI-TODAY'S VISIT:
Patient is  a 68 yo white male last seen May 2023 by MS Kinjal and who presents for follow up of cirrhosis with ascites due to fatty liver and alcohol.  He was originally referred by Dr. Borges and a copy will be sent to the referring provider  1[facility]25/24 [facility]  Dear Alfredito Geronimo,  The 1/11/24 labs were sent to me.  The the white blood cells 4.1, hemoglobin 10.7 and this is low we will continue to monitor this.  Higher than previously back in November was 8.6.  The MCV 85, platelets 123 and this is low and continue to monitor this as well.  Glucose 110, creatinine 0.78 which is normal.  The sodium and potassium are normal at 138 and 4.0 respectively.  Calcium slightly low and recommend sharing that with your primary care.  Bilirubin 2.0, alkaline phosphatase 112, AST 34, ALT 13.  Goal for the AST and ALT is less than 35.  Previously on December 12 the bilirubin 2.4, alkaline phosphatase 86, AST 34 and ALT of 13.  Given your creatinine is staying stable I am going to increase the Aldactone to 100 mg and send this to the pharmacy.  I will have the office call and notify you of this. Flor Layton PA-C MELD 14  HIs weight is down to 186 and happy to see this. down 10 lbs. water.  last paracentesis was in nov he is doing the aldactone 100 mg. he is doing the lasix still.    recap 1/4/24 ov  At last visit was having worsening ascites  had to order paracentesis 11/30/23 paracentesis with 5400 mL removed has not had paracentesis since then states needs one now asked about etoh and about 4 glasses in 3 months.  discussed need labs today and if cr ok go up to 100 mg on aldactone  if still needing taps will need echo Dear Alfredito Geronimo,   The 12/12/23 labs were given to me. These were not sent to me as your primary care ordrered them and so seeing them now. INR 1.5, the creatinine is 0.62, sodium 138, potassium 4.0. These are stable. Bilirubin 2.4, alkaline phosphatase 86, AST 34, ALT 13. MELD score up at 14 and need to follow this. Happy to see the creatinine was stable. We will see what the labs show from today.   Flor Layton PA-C   11/28/23 ov 11/22/23 labs with rbc 2.7, hemoglobin 8.6, mcv 96, [id number], creatinine 0.75, sodium 137, potassium 4.2, calcium low at 83, albumin low 3.3,bilirubin 2.2, alkaline phosphatase 122, ast 39, alt 16. inr 1.4MELD 13  EGD 11/13/23 grade 1 varices with no bleeding middle third esophagus and lower thrid. portal gastropathy in stomach  Dear Alfredito Calhoun,  The 11/15/23 mri was sent to me.  The liver with morphologic changes of chronic liver disease but they did not see any lesions.  Fat quantification was 4%.  They did not see any significant iron.  The gallbladder biliary tree appeared normal.  The spleen top normal in size at 12 cm.  The pancreas, adrenal glands normal.  They saw a renal cyst, please share this with the primary care.  They saw small paraesophageal perisplenic and perigastric varices as well as a recanalized umbilical vein.  They noted that the portal vasculature was patent. Need to be sure to stay on top of the EGDs.  They also saw ascites which stated was worse than last time and really need to make sure you watch the salt.  Overall they saw cirrhosis and evidence of portal hypertension with worsening ascites and we will review at your upcoming visit.  Flor Layton PA-C  He recently had norovirus as well need to check on raman hgb again  given ascites need to increase the aldactone to 50 mg and inc lasix 40 mg    8/22/23 ov  August 4, 2023 labs with hemoglobin A1c 5.8, INR 1.4 Complete blood count with white blood cells 4.5, red blood cells 3.5, hemoglobin 12.3, , platelets 82.  The sodium 137, potassium 4.1, creatinine 0.57, bilirubin 2.6, alkaline phosphatase 128, AST 60, ALT 23.  Ferritin 33. MELD 14 due for egd and needs to schedule this, reminded last visit  ETOH previously went 55 days without a dink  then went to travel and [facility] and just got back from 10 [place] in [place] and suspect this is affecting the labs  MRI due in novemeber  5/15/23 visit The May 13 MRI was sent to me. The liver calculated fat percentage is 4.2% which is not consistent with fatty liver.  They did see morphologic changes of chronic liver disease including lobar redistribution and nodular contour.  They also saw delayed reticular enhancement of the hepatic parenchyma which is compatible with fibrosis.  They also saw a few areas of perfusion anomalies throughout the liver but they did not see any suspicious lesions.  The hepatic vasculature is patent.  They saw paraesophageal, perigastric, and perisplenic varices and noted a recannulized umbilical vein.  The gallbladder and biliary tree appeared normal.  Spleen enlarged at 15.2 cm.  They thought the pancreas and adrenal glands were normal.  The kidneys with simple left renal cysts.  They saw colonic diverticula.  They saw minimal atherosclerotic disease without aortic aneurysm.  Please share this with the primary care.  They did see trace ascites and it is important that she stay on a low-salt diet.  They saw mild degenerative changes of the spine.  Overall the impression was that they saw morphological changes of chronic liver disease including portal hypertension and varices.  This is very important for you to stay on top of the endoscopies. We will review this at the follow-up. MELD 12  Creatinine 0.64, sodium 142, k 4.4, bilirubin 1.7, alp 156, ast 67, alt 16. rbc 4.06, mcv 100, platelets 69, inr 1.4 Discussed this and good to see less fat but need to monitor the labs   recap Spoke with the patient about his labs today 2/6/23 labs with rbc low, he will share this with his pcp. THe bilirubin as not improve and the alp, ast, and alt all back up and prior tehy were imprroving as he had stopped the alcohol and now back up and when asked he had been drinking again. He will be out of the country for 20 days and leaving tomorrow. Recommend he check labs while away. He has ordes to do when he gets back. Discussed the risk of continued ETOH use and er precautions  3/17/23/The white blood cells 5.9 red blood cells 3.75 and this is low please share this with your primary care.  MCV 97, platelets 77 and this is low.  The glucose 117, creatinine 0.69, sodium 142, potassium 3.9, bilirubin 2.3, alkaline phosphatase 150, AST 70, ALT 17.  Previously the bilirubin was 2.2,  Alkaline phosphatase was 168, AST 95 and ALT 27.  Some of these have improved.  INR is 1.4.  As below had a trip recently and did still consume etoh. He had mainly at dinner so did not stop. weight stable.   recap 1/30/23 December 12 labs showed that your hep B surface antibody strongly positive at 631.  Good to see it is present and important to note that its come down a little bit from last year and it was 665.9 to the current 631. Hepatitis A IgM was negative and hep A immunity was seen so this would indicate that both hep a and B vaccines are still providing immunity for you. Your INR was up to 1.3 from previous 1.1 back in February.   Glucose was about the same and 112 from previous 111 in February BUN was 13 creatinine 0.64 sodium 138 potassium 4.1 albumin was 3.9. Your total globulin was slightly up at 4.1. Your AST was higher at 159 from 64 which suggests that your liver is more inflamed and your ALT is likewise higher at 33 from 18 and you need to work on the continued alcohol use issues as we discussed.   Your alkaline phosphatase was 174 also higher at 134. Your total bilirubin doubled from 1.0 to 2.0 and some concern about these labs and they are increased.  You really need to work on this issue. White blood cell count was 6.3 hemoglobin 13 platelet count was 98 and previously your plate count was 135,000 so that has gone down.  Your hemoglobin also has come down from 13.2 to 13. RBC count was diminished at 3.84 and your MCV was 96.6.  RDW was elevated at 16.1.  Please share these labs with your primary provider. Neutrophils were 4536 and lymphocytes were slightly low at 838.  These were previously normal back in February. Meld 12 and meld na 12. Prior had been around a 7 so that went up. Am concerned with the rising bili and labs and once see the tb start to rise with this,  it could go into a much higher crisis level and that needs to be followed for. I would recommend that you redo the labs in a month and do an in person or telemed visit here to see if the labs are better.  Please work on this issues as you can clearly see the labs are worse.  1/24/23 labs: cbc : rbc low  mcv 99, platelets 88,  glucose 168, cr 0.76, bilirubin 2.2, alk phos 125, ast 62, alt 18 1.4 MELD 13 peth pending Has reduced etoh and went 1 week in early january and none since 19th on jan discussed the bilirubin and has had a cold and could have impacted it   Patient will be out of the country feb 11-march 2 due to vacation viking cruise and he will do this. Discussed diet while on the cruise and encouraged him on the weight loss prior he was 208 and now 194 and he will stay off the etoh and work on diet and we will monitor this  EGD due in may   recap Aug 10 ultrasound shows liver with increased echogenicity and no lesions. No dilated bile ducts seen. Gallbladder decompressed with mild wall thickening.  Gracia sign is negative. Pancreas visualized portions unremarkable. Right kidney and left kidney 13 cm with no hydronephrosis. Spleen slightly enlarged at 13.8 cm. Liver vessels were not well evaluated due to the increased echogenicity. Trace ascites was seen. Overall the liver appeared to be fatty with the hepatic vasculature is not well seen due to poor penetration.  Splenomegaly was noted.  Trace ascites was seen.  Important to stay on a low-salt diet. I believe that we may be able to use this ultrasound as a justification to do a more advanced scan such as an MRI next time.  Your February 2022 MRI did not show any concerning lesions but did have the perfusion anomalies and I believe this scan was done as the MRI was declined.  Given the limitations seen of the ultrasound we will be able to then order the MRI for the next scan.  [place] notified insurance denied mri so jovanna do u.s and as expect if they cannot see perfusion changes can then ask for mri then.   May 27 egd sent by Dr Borges. Grade 1 esophageal varices seen in middle third esophagus and lower third of esophagis. Mild portal hypertensive gastropathy seen in stomach. No varices in stomach. Duodenum normal. Good to see no banding needed. Plan for redo in 1 yr.  Says weight is down a little. Prior BMI 43 and needs to work on this given fatty liver and says heart team cleared to exercise more. He says 3 weeks ago at primary 8 pounds.  s/p EGD 12/17/21- with dr. borges showing grade II EV, s/p 3 bands in distal esophagus, moderate portal hypertensive gastropathy, no gastric varices, no active bleeding.     February 5, 2022 MRI Lower thorax was normal. Liver showed fat deposition by fat fraction of 15 to 20% and desired is less than 6.4%. They do see chronic liver disease changes including lobar redistribution and nodular contour.   Perfusion anomalies seen in the liver which usually lead to a 6-month surveillance for this. Periesophageal, perigastric, and perisplenic varices seen as well as recanalized umbilical vein although but suggestive of portal hypertension is present. Spleen is 13 cm. Pancreas was normal. Kidneys show simple left renal cyst. Colon shows signs of diverticulosis but without inflammation.  Important for you to be on a high-fiber diet with this and make sure that your primary provider is aware of same. Atherosclerosis seen of the aorta without aneurysm.  Please share with primary provider as well. May want to check lipid levels. Degenerative changes seen of the spine.  Labs 1/14/22- afp 5.2 normal, glucose 98 normal, creatinine 0.66 slightly low (prev 0.65), sodium 141 normal, tbili 0.7 normal, alk phos 121 (prev 123), ast 77 high (prev 81), alt 34 normal (prev 23) hemoglobin 11.9 low (prev 10.6), wbc 6.6 normal, platelets 196 normal, rdw 17.6 high and  monocytes 1.0 high (please share with primary md), inr 1.1 normal, MELD 7, MELD-NA 7.  He was evaluated at [facility] on 10/7/21-10/11/21 for hgb 6.8 (also on eliquis)- admitted and given 2 units blood, hgb up to 9.1. Not enough fluid to tap per US.  No scopes performed.    He notes 3 days of small volume hematemesis in middle of Sept.  Wasn't eating much at the time.   He noted systolic BP <100 on several occasions since hospitalization.  They stopped carvedilol.   He was told he had fatty liver 2 years ago. states 'stomach getting bigger' in april 2021 timeframe. per pt was drinking more after jan 2021 and then stopped on may 15, 2021 when he was hospitalized.  Has since started drinking again.    Drinking 2 glasses of wine per day at time of Oct 2021 office visit.  Previously drinking 3-4 drinks daily.   Encouraged absolute alcohol cessation and rehab/therapy.   alk phos 127 (prev 129), ast 48 (prev 53), alt 14 (prev 16), MELD 7 on 10/6/21 labs.  Weight 237 11/2020.  205 1/2021 after stomach flu, 225 prior to para on 5/16/21.  Weight 216 in June 2021.  Weight 212 August.  EGD 5/20/21- 2 cords of small nonbleeding grade 1-2 esophgeal varices in the distal esophagus.  Most recent MRI 6/26/21 showing trace ascites (prior moderate on CT in May 2021)- on lasix 20 mg, spironolactone 12.5 mg/day and doing well with this.  Eating less salt but could do better with this.  No HSE, no signs of GI bleeding, no abdominal pain, jaundice, pruritus, fevers, chills, increased abdominal distention.  Was recommended to go to ED after 10/6/21 labs showing hgb 6.8.    July 26 MRI- Lower thorax was normal.  Liver showed some fat deposition as well as changes of chronic liver disease including lobar redistribution and nodular contour.  They also see some delayed particular enhancement of the liver which would be compatible with fibrosis. They see perfusion changes throughout the liver but no suspicious lesions.   Typically we redo an MRI in 6 months to follow these perfusion anomalies. They see varices near the esophagus, stomach and spleen area.  Some of the paraesophageal varices are submucosal.  So this is important to follow with egd as you are doing. The gallbladder and biliary tree was normal.   The spleen was 13.1 cm which many would consider slightly enlarged.  Pancreas was normal.  Simple left renal cyst was seen but no size given.  Some nonspecific lymph nodes were seen near the liver. They feel that those lymph nodes are likely reactive. You do have trace ascites so would be very important for you to remain on a low-salt diet and on your diuretics that you are on.  Low-salt diet is really the basis for the ascites control and then the diuretics help out more if you are already on that type of diet. Some mild degenerative changes were seen of the spine. Overall we would recommend repeat the MRI in 6 months. Important to note that on the previous CT from May of this year you had a cirrhotic appearance of liver but moderate intra-abdominal ascites that as well as the probable varices as well. The EGD was done on May 20 of this year and it showed 2 cords of small grade 1-2 esophageal varices that were seen.  hep c negative, iron sat low 7%-follow up with pcp or gi for this. ceruloplasmin 32. ferritin low 24. asma neg. ama neg. alpha 1 MM. shavonne neg. ast 34, alt 9. hgb low 9.8-need to see gi or pcp. plt 239. monocytes 1200.   CT triple phase 5/21/21 showed no definitive evidence of HCC, no enhancing liver masses, cirrhotic appearance of the liver with small to moderate intra-abdominal ascites and probable upper abdominal varices.  Esophageal and gastric varices may be present, patent portal veins.   Paracentesis 5/20/21 with 8.2L of fluid removed- path with numerous meseothelial cells and histiocytes, no atypical or malignant cells present.  Colonoscopy 5/20/21- severe left sided diverticulosis, tortuous left colon, 4 mm sessile sigmoid colon polyp removed, small nonbleeding internal hemorrhoids, limited exam beause of suboptimal prep, repeat 3 years.  EGD 5/20/21- 2 cords of small nonbleeding grade 1-2 esophgeal varices in the distal esophagus.    U/S doppler 5/19/21- hepatopetal flow of the liver. Pt here for follow up.  echo showed EF 55-60%

## 2024-04-01 ENCOUNTER — LAB (OUTPATIENT)
Dept: URBAN - METROPOLITAN AREA TELEHEALTH 2 | Facility: TELEHEALTH | Age: 69
End: 2024-04-01

## 2024-04-02 LAB
A/G RATIO: 0.9
ABSOLUTE BASOPHILS: 69
ABSOLUTE EOSINOPHILS: 152
ABSOLUTE LYMPHOCYTES: 867.3
ABSOLUTE MONOCYTES: 666
ABSOLUTE NEUTROPHILS: 3146
ALBUMIN: 3
ALKALINE PHOSPHATASE: 68
ALT (SGPT): 12
AST (SGOT): 35
BASOPHILS: 1.4
BILIRUBIN, TOTAL: 2.9
BUN/CREATININE RATIO: (no result)
BUN: 13
CALCIUM: 8.3
CARBON DIOXIDE, TOTAL: 28
CHLORIDE: 103
CREATININE: 0.75
EGFR: 98
EOSINOPHILS: 3.1
GLOBULIN, TOTAL: 3.2
GLUCOSE: 95
HEMATOCRIT: 31.1
HEMOGLOBIN: 11.2
INR: 1.4
LYMPHOCYTES: 17.7
MCH: 33
MCHC: 36
MCV: 91.7
MONOCYTES: 13.6
MPV: 11.5
NEUTROPHILS: 64.2
PLATELET COUNT: 97
POTASSIUM: 4.3
PROTEIN, TOTAL: 6.2
PT: 14.9
RDW: 15.3
RED BLOOD CELL COUNT: 3.39
SODIUM: 136
WHITE BLOOD CELL COUNT: 4.9

## 2024-04-05 ENCOUNTER — TELEP (OUTPATIENT)
Dept: URBAN - METROPOLITAN AREA TELEHEALTH 2 | Facility: TELEHEALTH | Age: 69
End: 2024-04-05
Payer: MEDICARE

## 2024-04-05 ENCOUNTER — LAB (OUTPATIENT)
Dept: URBAN - METROPOLITAN AREA TELEHEALTH 2 | Facility: TELEHEALTH | Age: 69
End: 2024-04-05

## 2024-04-05 DIAGNOSIS — K76.0 FATTY LIVER: ICD-10-CM

## 2024-04-05 DIAGNOSIS — R18.8 OTHER ASCITES: ICD-10-CM

## 2024-04-05 DIAGNOSIS — I85.10 ESOPH VARICE OTHER DIS: ICD-10-CM

## 2024-04-05 DIAGNOSIS — K74.60 UNSPECIFIED CIRRHOSIS OF LIVER: ICD-10-CM

## 2024-04-05 PROCEDURE — 99214 OFFICE O/P EST MOD 30 MIN: CPT | Performed by: PHYSICIAN ASSISTANT

## 2024-04-05 RX ORDER — OMEGA-3/DHA/EPA/FISH OIL 120-180 MG
1 TABLET CAPSULE ORAL ONCE A DAY
COMMUNITY

## 2024-04-05 RX ORDER — LEVOTHYROXINE SODIUM 125 UG/1
1 TABLET IN THE MORNING ON AN EMPTY STOMACH TABLET ORAL ONCE A DAY
COMMUNITY

## 2024-04-05 RX ORDER — FUROSEMIDE 40 MG/1
1 TABLET TABLET ORAL ONCE A DAY
Qty: 30 TABLET | Refills: 2 | COMMUNITY

## 2024-04-05 RX ORDER — THIAMINE HCL 100 MG
1 TABLET TABLET ORAL ONCE A DAY
COMMUNITY

## 2024-04-05 RX ORDER — SPIRONOLACTONE 100 MG/1
1 TABLET TABLET, FILM COATED ORAL ONCE A DAY
Qty: 30 TABLET | Refills: 2 | COMMUNITY
End: 2024-04-11

## 2024-04-05 RX ORDER — FOLIC ACID 0.8 MG
1 TABLET TABLET ORAL ONCE A DAY
COMMUNITY

## 2024-04-05 RX ORDER — NADOLOL 20 MG/1
1 TABLET TABLET ORAL BID
COMMUNITY

## 2024-04-05 RX ORDER — METRONIDAZOLE 7.5 MG/G
1 APPLICATION CREAM TOPICAL TWICE A DAY
COMMUNITY

## 2024-04-05 NOTE — HPI-TODAY'S VISIT:
Patient is  a 68 yo white male last seen Jan 2024 by myself and who presents for follow up of cirrhosis with ascites due to fatty liver and alcohol.  He was originally referred by Dr. Borges and a copy will be sent to the referring provider   4/5/24 telemed via [employer]   The 4/1/24 labs were sent to me.  The white blood cells 4.9, hemoglobin 11.2, this is slightly low.  Previously it was 10.7 so slightly higher this check.  Please share this with your primary care provider.  The MCV 91, platelets 97, low and we will continue to monitor this.  Creatinine 0.75, sodium 136, potassium 4.3, calcium 8.3 and this is low, please share this with your primary care.  Bilirubin 2.9, alkaline phosphatase 68, AST 35, ALT 12.  Goal for the AST and ALT is less than 35.  Bilirubin up.  INR up at 1.4.  Previously 1.5 so slightly improved since last check. MELD 14, MELD na 15. Will reviw at the follow up and see how you are doing.    The February 6 labs were sent to me. The sodium 138, potassium 4.0, creatinine 0.64, bilirubin up at 3.4, alkaline phosphatase 120, AST 61, ALT 17. White blood cells 4.1, red blood cells 3.78, hemoglobin 11.1, MCV 90, platelets 84. INR 1.7. Previously 1.5 so slightly higher now. Have you been consuming alcohol again? The ast is back up. MELD up at 16 and may need to have you see the [place] team on this. I will go ahead and submit the referral. Please let us know if there are any new issues.   Discussed the labs and etoh and needs to stop drinking.  Discussed MRI repeat in may 2024 thinks he needs paracentesis and discussed can order but weight seems stable compared to last visit continue current diuretics   recap 1/25/24 telemed via [employer]  Dear Alfredito Geronimo,  The 1/11/24 labs were sent to me.  The the white blood cells 4.1, hemoglobin 10.7 and this is low we will continue to monitor this.  Higher than previously back in November was 8.6.  The MCV 85, platelets 123 and this is low and continue to monitor this as well.  Glucose 110, creatinine 0.78 which is normal.  The sodium and potassium are normal at 138 and 4.0 respectively.  Calcium slightly low and recommend sharing that with your primary care.  Bilirubin 2.0, alkaline phosphatase 112, AST 34, ALT 13.  Goal for the AST and ALT is less than 35.  Previously on December 12 the bilirubin 2.4, alkaline phosphatase 86, AST 34 and ALT of 13.  Given your creatinine is staying stable I am going to increase the Aldactone to 100 mg and send this to the pharmacy.  I will have the office call and notify you of this. Flor Layton PA-C MELD 14  HIs weight is down to 186 and happy to see this. down 10 lbs. water.  last paracentesis was in nov he is doing the aldactone 100 mg. he is doing the lasix still.    1/4/24 ov  At last visit was having worsening ascites  had to order paracentesis 11/30/23 paracentesis with 5400 mL removed has not had paracentesis since then states needs one now asked about etoh and about 4 glasses in 3 months.  discussed need labs today and if cr ok go up to 100 mg on aldactone  if still needing taps will need echo Dear Alfredito Geronimo,   The 12/12/23 labs were given to me. These were not sent to me as your primary care ordrered them and so seeing them now. INR 1.5, the creatinine is 0.62, sodium 138, potassium 4.0. These are stable. Bilirubin 2.4, alkaline phosphatase 86, AST 34, ALT 13. MELD score up at 14 and need to follow this. Happy to see the creatinine was stable. We will see what the labs show from today.   Flor Layton PA-C   11/28/23 ov 11/22/23 labs with rbc 2.7, hemoglobin 8.6, mcv 96, [id number], creatinine 0.75, sodium 137, potassium 4.2, calcium low at 83, albumin low 3.3,bilirubin 2.2, alkaline phosphatase 122, ast 39, alt 16. inr 1.4MELD 13  EGD 11/13/23 grade 1 varices with no bleeding middle third esophagus and lower thrid. portal gastropathy in stomach  Dear Alfredito Calhoun,  The 11/15/23 mri was sent to me.  The liver with morphologic changes of chronic liver disease but they did not see any lesions.  Fat quantification was 4%.  They did not see any significant iron.  The gallbladder biliary tree appeared normal.  The spleen top normal in size at 12 cm.  The pancreas, adrenal glands normal.  They saw a renal cyst, please share this with the primary care.  They saw small paraesophageal perisplenic and perigastric varices as well as a recanalized umbilical vein.  They noted that the portal vasculature was patent. Need to be sure to stay on top of the EGDs.  They also saw ascites which stated was worse than last time and really need to make sure you watch the salt.  Overall they saw cirrhosis and evidence of portal hypertension with worsening ascites and we will review at your upcoming visit.  Flor Layton PA-C  He recently had norovirus as well need to check on raman hgb again  given ascites need to increase the aldactone to 50 mg and inc lasix 40 mg    8/22/23 ov  August 4, 2023 labs with hemoglobin A1c 5.8, INR 1.4 Complete blood count with white blood cells 4.5, red blood cells 3.5, hemoglobin 12.3, , platelets 82.  The sodium 137, potassium 4.1, creatinine 0.57, bilirubin 2.6, alkaline phosphatase 128, AST 60, ALT 23.  Ferritin 33. MELD 14 due for egd and needs to schedule this, reminded last visit  ETOH previously went 55 days without a dink  then went to travel and [facility] and just got back from 10 [place] in [place] and suspect this is affecting the labs  MRI due in novemeber  5/15/23 visit The May 13 MRI was sent to me. The liver calculated fat percentage is 4.2% which is not consistent with fatty liver.  They did see morphologic changes of chronic liver disease including lobar redistribution and nodular contour.  They also saw delayed reticular enhancement of the hepatic parenchyma which is compatible with fibrosis.  They also saw a few areas of perfusion anomalies throughout the liver but they did not see any suspicious lesions.  The hepatic vasculature is patent.  They saw paraesophageal, perigastric, and perisplenic varices and noted a recannulized umbilical vein.  The gallbladder and biliary tree appeared normal.  Spleen enlarged at 15.2 cm.  They thought the pancreas and adrenal glands were normal.  The kidneys with simple left renal cysts.  They saw colonic diverticula.  They saw minimal atherosclerotic disease without aortic aneurysm.  Please share this with the primary care.  They did see trace ascites and it is important that she stay on a low-salt diet.  They saw mild degenerative changes of the spine.  Overall the impression was that they saw morphological changes of chronic liver disease including portal hypertension and varices.  This is very important for you to stay on top of the endoscopies. We will review this at the follow-up. MELD 12  Creatinine 0.64, sodium 142, k 4.4, bilirubin 1.7, alp 156, ast 67, alt 16. rbc 4.06, mcv 100, platelets 69, inr 1.4 Discussed this and good to see less fat but need to monitor the labs   recap Spoke with the patient about his labs today 2/6/23 labs with rbc low, he will share this with his pcp. THe bilirubin as not improve and the alp, ast, and alt all back up and prior tehy were imprroving as he had stopped the alcohol and now back up and when asked he had been drinking again. He will be out of the country for 20 days and leaving tomorrow. Recommend he check labs while away. He has ordes to do when he gets back. Discussed the risk of continued ETOH use and er precautions  3/17/23/The white blood cells 5.9 red blood cells 3.75 and this is low please share this with your primary care.  MCV 97, platelets 77 and this is low.  The glucose 117, creatinine 0.69, sodium 142, potassium 3.9, bilirubin 2.3, alkaline phosphatase 150, AST 70, ALT 17.  Previously the bilirubin was 2.2,  Alkaline phosphatase was 168, AST 95 and ALT 27.  Some of these have improved.  INR is 1.4.  As below had a trip recently and did still consume etoh. He had mainly at dinner so did not stop. weight stable.  1/30/23 December 12 labs showed that your hep B surface antibody strongly positive at 631.  Good to see it is present and important to note that its come down a little bit from last year and it was 665.9 to the current 631. Hepatitis A IgM was negative and hep A immunity was seen so this would indicate that both hep a and B vaccines are still providing immunity for you. Your INR was up to 1.3 from previous 1.1 back in February.   Glucose was about the same and 112 from previous 111 in February BUN was 13 creatinine 0.64 sodium 138 potassium 4.1 albumin was 3.9. Your total globulin was slightly up at 4.1. Your AST was higher at 159 from 64 which suggests that your liver is more inflamed and your ALT is likewise higher at 33 from 18 and you need to work on the continued alcohol use issues as we discussed.   Your alkaline phosphatase was 174 also higher at 134. Your total bilirubin doubled from 1.0 to 2.0 and some concern about these labs and they are increased.  You really need to work on this issue. White blood cell count was 6.3 hemoglobin 13 platelet count was 98 and previously your plate count was 135,000 so that has gone down.  Your hemoglobin also has come down from 13.2 to 13. RBC count was diminished at 3.84 and your MCV was 96.6.  RDW was elevated at 16.1.  Please share these labs with your primary provider. Neutrophils were 4536 and lymphocytes were slightly low at 838.  These were previously normal back in February. Meld 12 and meld na 12. Prior had been around a 7 so that went up. Am concerned with the rising bili and labs and once see the tb start to rise with this,  it could go into a much higher crisis level and that needs to be followed for. I would recommend that you redo the labs in a month and do an in person or telemed visit here to see if the labs are better.  Please work on this issues as you can clearly see the labs are worse.  1/24/23 labs: cbc : rbc low  mcv 99, platelets 88,  glucose 168, cr 0.76, bilirubin 2.2, alk phos 125, ast 62, alt 18 1.4 MELD 13 peth pending Has reduced etoh and went 1 week in early january and none since 19th on jan discussed the bilirubin and has had a cold and could have impacted it   Patient will be out of the country feb 11-march 2 due to vacation viking cruise and he will do this. Discussed diet while on the cruise and encouraged him on the weight loss prior he was 208 and now 194 and he will stay off the etoh and work on diet and we will monitor this  EGD due in may   recap Aug 10 ultrasound shows liver with increased echogenicity and no lesions. No dilated bile ducts seen. Gallbladder decompressed with mild wall thickening.  Gracia sign is negative. Pancreas visualized portions unremarkable. Right kidney and left kidney 13 cm with no hydronephrosis. Spleen slightly enlarged at 13.8 cm. Liver vessels were not well evaluated due to the increased echogenicity. Trace ascites was seen. Overall the liver appeared to be fatty with the hepatic vasculature is not well seen due to poor penetration.  Splenomegaly was noted.  Trace ascites was seen.  Important to stay on a low-salt diet. I believe that we may be able to use this ultrasound as a justification to do a more advanced scan such as an MRI next time.  Your February 2022 MRI did not show any concerning lesions but did have the perfusion anomalies and I believe this scan was done as the MRI was declined.  Given the limitations seen of the ultrasound we will be able to then order the MRI for the next scan.  [place] notified insurance denied mri so jovanna do u.s and as expect if they cannot see perfusion changes can then ask for mri then.   May 27 egd sent by Dr Borges. Grade 1 esophageal varices seen in middle third esophagus and lower third of esophagis. Mild portal hypertensive gastropathy seen in stomach. No varices in stomach. Duodenum normal. Good to see no banding needed. Plan for redo in 1 yr.  Says weight is down a little. Prior BMI 43 and needs to work on this given fatty liver and says heart team cleared to exercise more. He says 3 weeks ago at primary 8 pounds.  s/p EGD 12/17/21- with dr. borges showing grade II EV, s/p 3 bands in distal esophagus, moderate portal hypertensive gastropathy, no gastric varices, no active bleeding.     February 5, 2022 MRI Lower thorax was normal. Liver showed fat deposition by fat fraction of 15 to 20% and desired is less than 6.4%. They do see chronic liver disease changes including lobar redistribution and nodular contour.   Perfusion anomalies seen in the liver which usually lead to a 6-month surveillance for this. Periesophageal, perigastric, and perisplenic varices seen as well as recanalized umbilical vein although but suggestive of portal hypertension is present. Spleen is 13 cm. Pancreas was normal. Kidneys show simple left renal cyst. Colon shows signs of diverticulosis but without inflammation.  Important for you to be on a high-fiber diet with this and make sure that your primary provider is aware of same. Atherosclerosis seen of the aorta without aneurysm.  Please share with primary provider as well. May want to check lipid levels. Degenerative changes seen of the spine.  Labs 1/14/22- afp 5.2 normal, glucose 98 normal, creatinine 0.66 slightly low (prev 0.65), sodium 141 normal, tbili 0.7 normal, alk phos 121 (prev 123), ast 77 high (prev 81), alt 34 normal (prev 23) hemoglobin 11.9 low (prev 10.6), wbc 6.6 normal, platelets 196 normal, rdw 17.6 high and  monocytes 1.0 high (please share with primary md), inr 1.1 normal, MELD 7, MELD-NA 7.  He was evaluated at [facility] on 10/7/21-10/11/21 for hgb 6.8 (also on eliquis)- admitted and given 2 units blood, hgb up to 9.1. Not enough fluid to tap per US.  No scopes performed.    He notes 3 days of small volume hematemesis in middle of Sept.  Wasn't eating much at the time.   He noted systolic BP <100 on several occasions since hospitalization.  They stopped carvedilol.   He was told he had fatty liver 2 years ago. states 'stomach getting bigger' in april 2021 timeframe. per pt was drinking more after jan 2021 and then stopped on may 15, 2021 when he was hospitalized.  Has since started drinking again.    Drinking 2 glasses of wine per day at time of Oct 2021 office visit.  Previously drinking 3-4 drinks daily.   Encouraged absolute alcohol cessation and rehab/therapy.   alk phos 127 (prev 129), ast 48 (prev 53), alt 14 (prev 16), MELD 7 on 10/6/21 labs.  Weight 237 11/2020.  205 1/2021 after stomach flu, 225 prior to para on 5/16/21.  Weight 216 in June 2021.  Weight 212 August.  EGD 5/20/21- 2 cords of small nonbleeding grade 1-2 esophgeal varices in the distal esophagus.  Most recent MRI 6/26/21 showing trace ascites (prior moderate on CT in May 2021)- on lasix 20 mg, spironolactone 12.5 mg/day and doing well with this.  Eating less salt but could do better with this.  No HSE, no signs of GI bleeding, no abdominal pain, jaundice, pruritus, fevers, chills, increased abdominal distention.  Was recommended to go to ED after 10/6/21 labs showing hgb 6.8.    July 26 MRI- Lower thorax was normal.  Liver showed some fat deposition as well as changes of chronic liver disease including lobar redistribution and nodular contour.  They also see some delayed particular enhancement of the liver which would be compatible with fibrosis. They see perfusion changes throughout the liver but no suspicious lesions.   Typically we redo an MRI in 6 months to follow these perfusion anomalies. They see varices near the esophagus, stomach and spleen area.  Some of the paraesophageal varices are submucosal.  So this is important to follow with egd as you are doing. The gallbladder and biliary tree was normal.   The spleen was 13.1 cm which many would consider slightly enlarged.  Pancreas was normal.  Simple left renal cyst was seen but no size given.  Some nonspecific lymph nodes were seen near the liver. They feel that those lymph nodes are likely reactive. You do have trace ascites so would be very important for you to remain on a low-salt diet and on your diuretics that you are on.  Low-salt diet is really the basis for the ascites control and then the diuretics help out more if you are already on that type of diet. Some mild degenerative changes were seen of the spine. Overall we would recommend repeat the MRI in 6 months. Important to note that on the previous CT from May of this year you had a cirrhotic appearance of liver but moderate intra-abdominal ascites that as well as the probable varices as well. The EGD was done on May 20 of this year and it showed 2 cords of small grade 1-2 esophageal varices that were seen.  hep c negative, iron sat low 7%-follow up with pcp or gi for this. ceruloplasmin 32. ferritin low 24. asma neg. ama neg. alpha 1 MM. shavonne neg. ast 34, alt 9. hgb low 9.8-need to see gi or pcp. plt 239. monocytes 1200.   CT triple phase 5/21/21 showed no definitive evidence of HCC, no enhancing liver masses, cirrhotic appearance of the liver with small to moderate intra-abdominal ascites and probable upper abdominal varices.  Esophageal and gastric varices may be present, patent portal veins.   Paracentesis 5/20/21 with 8.2L of fluid removed- path with numerous meseothelial cells and histiocytes, no atypical or malignant cells present.  Colonoscopy 5/20/21- severe left sided diverticulosis, tortuous left colon, 4 mm sessile sigmoid colon polyp removed, small nonbleeding internal hemorrhoids, limited exam beause of suboptimal prep, repeat 3 years.  EGD 5/20/21- 2 cords of small nonbleeding grade 1-2 esophgeal varices in the distal esophagus.    U/S doppler 5/19/21- hepatopetal flow of the liver. Pt here for follow up.  echo showed EF 55-60%

## 2024-04-05 NOTE — PHYSICAL EXAM SKIN:
Called patient  She is in the hospital for EEG  Spoke with her   She did not follow-up with GI   reports she has not had labs rechecked.  Advised patient needs to have LFT rechecked.  Also reports that [place] [facility] did not see any other additional findings on review of her slide review.     no rashes , no suspicious lesions , no areas of discoloration

## 2024-05-13 ENCOUNTER — LAB OUTSIDE AN ENCOUNTER (OUTPATIENT)
Dept: URBAN - METROPOLITAN AREA TELEHEALTH 2 | Facility: TELEHEALTH | Age: 69
End: 2024-05-13

## 2024-07-05 ENCOUNTER — LAB OUTSIDE AN ENCOUNTER (OUTPATIENT)
Dept: URBAN - METROPOLITAN AREA TELEHEALTH 2 | Facility: TELEHEALTH | Age: 69
End: 2024-07-05

## 2024-08-01 ENCOUNTER — OFFICE VISIT (OUTPATIENT)
Dept: URBAN - METROPOLITAN AREA CLINIC 86 | Facility: CLINIC | Age: 69
End: 2024-08-01

## 2024-08-05 ENCOUNTER — LAB OUTSIDE AN ENCOUNTER (OUTPATIENT)
Dept: URBAN - METROPOLITAN AREA CLINIC 86 | Facility: CLINIC | Age: 69
End: 2024-08-05

## 2024-08-05 ENCOUNTER — OFFICE VISIT (OUTPATIENT)
Dept: URBAN - METROPOLITAN AREA CLINIC 86 | Facility: CLINIC | Age: 69
End: 2024-08-05

## 2024-08-05 RX ORDER — LEVOTHYROXINE SODIUM 125 UG/1
1 TABLET IN THE MORNING ON AN EMPTY STOMACH TABLET ORAL ONCE A DAY
COMMUNITY

## 2024-08-05 RX ORDER — OMEGA-3/DHA/EPA/FISH OIL 120-180 MG
1 TABLET CAPSULE ORAL ONCE A DAY
COMMUNITY

## 2024-08-05 RX ORDER — NADOLOL 20 MG/1
1 TABLET TABLET ORAL BID
COMMUNITY

## 2024-08-05 RX ORDER — METRONIDAZOLE 7.5 MG/G
1 APPLICATION CREAM TOPICAL TWICE A DAY
COMMUNITY

## 2024-08-05 RX ORDER — FOLIC ACID 0.8 MG
1 TABLET TABLET ORAL ONCE A DAY
COMMUNITY

## 2024-08-05 RX ORDER — FUROSEMIDE 40 MG/1
1 TABLET TABLET ORAL ONCE A DAY
Qty: 30 TABLET | Refills: 2 | COMMUNITY

## 2024-08-05 RX ORDER — THIAMINE HCL 100 MG
1 TABLET TABLET ORAL ONCE A DAY
COMMUNITY

## 2024-08-05 NOTE — HPI-TODAY'S VISIT:
Patient is  a 68 yo white male last seen Jan 2024 by myself and who presents for follow up of cirrhosis with ascites due to fatty liver and alcohol.  He was originally referred by Dr. Borges and a copy will be sent to the referring provider   4/5/24 telemed via [employer]   The 4/1/24 labs were sent to me.  The white blood cells 4.9, hemoglobin 11.2, this is slightly low.  Previously it was 10.7 so slightly higher this check.  Please share this with your primary care provider.  The MCV 91, platelets 97, low and we will continue to monitor this.  Creatinine 0.75, sodium 136, potassium 4.3, calcium 8.3 and this is low, please share this with your primary care.  Bilirubin 2.9, alkaline phosphatase 68, AST 35, ALT 12.  Goal for the AST and ALT is less than 35.  Bilirubin up.  INR up at 1.4.  Previously 1.5 so slightly improved since last check. MELD 14, MELD na 15. Will reviw at the follow up and see how you are doing.    The February 6 labs were sent to me. The sodium 138, potassium 4.0, creatinine 0.64, bilirubin up at 3.4, alkaline phosphatase 120, AST 61, ALT 17. White blood cells 4.1, red blood cells 3.78, hemoglobin 11.1, MCV 90, platelets 84. INR 1.7. Previously 1.5 so slightly higher now. Have you been consuming alcohol again? The ast is back up. MELD up at 16 and may need to have you see the [place] team on this. I will go ahead and submit the referral. Please let us know if there are any new issues.   Discussed the labs and etoh and needs to stop drinking.  Discussed MRI repeat in may 2024 thinks he needs paracentesis and discussed can order but weight seems stable compared to last visit continue current diuretics   recap 1/25/24 telemed via [employer]  Dear Alfredito Geronimo,  The 1/11/24 labs were sent to me.  The the white blood cells 4.1, hemoglobin 10.7 and this is low we will continue to monitor this.  Higher than previously back in November was 8.6.  The MCV 85, platelets 123 and this is low and continue to monitor this as well.  Glucose 110, creatinine 0.78 which is normal.  The sodium and potassium are normal at 138 and 4.0 respectively.  Calcium slightly low and recommend sharing that with your primary care.  Bilirubin 2.0, alkaline phosphatase 112, AST 34, ALT 13.  Goal for the AST and ALT is less than 35.  Previously on December 12 the bilirubin 2.4, alkaline phosphatase 86, AST 34 and ALT of 13.  Given your creatinine is staying stable I am going to increase the Aldactone to 100 mg and send this to the pharmacy.  I will have the office call and notify you of this. Flor Layton PA-C MELD 14  HIs weight is down to 186 and happy to see this. down 10 lbs. water.  last paracentesis was in nov he is doing the aldactone 100 mg. he is doing the lasix still.    1/4/24 ov  At last visit was having worsening ascites  had to order paracentesis 11/30/23 paracentesis with 5400 mL removed has not had paracentesis since then states needs one now asked about etoh and about 4 glasses in 3 months.  discussed need labs today and if cr ok go up to 100 mg on aldactone  if still needing taps will need echo Dear Alfredito Geronimo,   The 12/12/23 labs were given to me. These were not sent to me as your primary care ordrered them and so seeing them now. INR 1.5, the creatinine is 0.62, sodium 138, potassium 4.0. These are stable. Bilirubin 2.4, alkaline phosphatase 86, AST 34, ALT 13. MELD score up at 14 and need to follow this. Happy to see the creatinine was stable. We will see what the labs show from today.   Flor Layton PA-C   11/28/23 ov 11/22/23 labs with rbc 2.7, hemoglobin 8.6, mcv 96, [id number], creatinine 0.75, sodium 137, potassium 4.2, calcium low at 83, albumin low 3.3,bilirubin 2.2, alkaline phosphatase 122, ast 39, alt 16. inr 1.4MELD 13  EGD 11/13/23 grade 1 varices with no bleeding middle third esophagus and lower thrid. portal gastropathy in stomach  Dear Alfredito Calhoun,  The 11/15/23 mri was sent to me.  The liver with morphologic changes of chronic liver disease but they did not see any lesions.  Fat quantification was 4%.  They did not see any significant iron.  The gallbladder biliary tree appeared normal.  The spleen top normal in size at 12 cm.  The pancreas, adrenal glands normal.  They saw a renal cyst, please share this with the primary care.  They saw small paraesophageal perisplenic and perigastric varices as well as a recanalized umbilical vein.  They noted that the portal vasculature was patent. Need to be sure to stay on top of the EGDs.  They also saw ascites which stated was worse than last time and really need to make sure you watch the salt.  Overall they saw cirrhosis and evidence of portal hypertension with worsening ascites and we will review at your upcoming visit.  Flor Layton PA-C  He recently had norovirus as well need to check on raman hgb again  given ascites need to increase the aldactone to 50 mg and inc lasix 40 mg    8/22/23 ov  August 4, 2023 labs with hemoglobin A1c 5.8, INR 1.4 Complete blood count with white blood cells 4.5, red blood cells 3.5, hemoglobin 12.3, , platelets 82.  The sodium 137, potassium 4.1, creatinine 0.57, bilirubin 2.6, alkaline phosphatase 128, AST 60, ALT 23.  Ferritin 33. MELD 14 due for egd and needs to schedule this, reminded last visit  ETOH previously went 55 days without a dink  then went to [place] and [facility] [place] and just got [place] and suspect this is affecting the labs  MRI due in novemeber  5/15/23 visit The May 13 MRI was sent to me. The liver calculated fat percentage is 4.2% which is not consistent with fatty liver.  They did see morphologic changes of chronic liver disease including lobar redistribution and nodular contour.  They also saw delayed reticular enhancement of the hepatic parenchyma which is compatible with fibrosis.  They also saw a few areas of perfusion anomalies throughout the liver but they did not see any suspicious lesions.  The hepatic vasculature is patent.  They saw paraesophageal, perigastric, and perisplenic varices and noted a recannulized umbilical vein.  The gallbladder and biliary tree appeared normal.  Spleen enlarged at 15.2 cm.  They thought the pancreas and adrenal glands were normal.  The kidneys with simple left renal cysts.  They saw colonic diverticula.  They saw minimal atherosclerotic disease without aortic aneurysm.  Please share this with the primary care.  They did see trace ascites and it is important that she stay on a low-salt diet.  They saw mild degenerative changes of the spine.  Overall the impression was that they saw morphological changes of chronic liver disease including portal hypertension and varices.  This is very important for you to stay on top of the endoscopies. We will review this at the follow-up. MELD 12  Creatinine 0.64, sodium 142, k 4.4, bilirubin 1.7, alp 156, ast 67, alt 16. rbc 4.06, mcv 100, platelets 69, inr 1.4 Discussed this and good to see less fat but need to monitor the labs   recap Spoke with the patient about his labs today 2/6/23 labs with rbc low, he will share this with his pcp. THe bilirubin as not improve and the alp, ast, and alt all back up and prior tehy were imprroving as he had stopped the alcohol and now back up and when asked he had been drinking again. He will be out of the country for 20 days and leaving tomorrow. Recommend he check labs while away. He has ordes to do when he gets back. Discussed the risk of continued ETOH use and er precautions  3/17/23/The white blood cells 5.9 red blood cells 3.75 and this is low please share this with your primary care.  MCV 97, platelets 77 and this is low.  The glucose 117, creatinine 0.69, sodium 142, potassium 3.9, bilirubin 2.3, alkaline phosphatase 150, AST 70, ALT 17.  Previously the bilirubin was 2.2,  Alkaline phosphatase was 168, AST 95 and ALT 27.  Some of these have improved.  INR is 1.4.  As below had a trip recently and did still consume etoh. He had mainly at dinner so did not stop. weight stable.  1/30/23 December 12 labs showed that your hep B surface antibody strongly positive at 631.  Good to see it is present and important to note that its come down a little bit from last year and it was 665.9 to the current 631. Hepatitis A IgM was negative and hep A immunity was seen so this would indicate that both hep a and B vaccines are still providing immunity for you. Your INR was up to 1.3 from previous 1.1 back in February.   Glucose was about the same and 112 from previous 111 in February BUN was 13 creatinine 0.64 sodium 138 potassium 4.1 albumin was 3.9. Your total globulin was slightly up at 4.1. Your AST was higher at 159 from 64 which suggests that your liver is more inflamed and your ALT is likewise higher at 33 from 18 and you need to work on the continued alcohol use issues as we discussed.   Your alkaline phosphatase was 174 also higher at 134. Your total bilirubin doubled from 1.0 to 2.0 and some concern about these labs and they are increased.  You really need to work on this issue. White blood cell count was 6.3 hemoglobin 13 platelet count was 98 and previously your plate count was 135,000 so that has gone down.  Your hemoglobin also has come down from 13.2 to 13. RBC count was diminished at 3.84 and your MCV was 96.6.  RDW was elevated at 16.1.  Please share these labs with your primary provider. Neutrophils were 4536 and lymphocytes were slightly low at 838.  These were previously normal back in February. Meld 12 and meld na 12. Prior had been around a 7 so that went up. Am concerned with the rising bili and labs and once see the tb start to rise with this,  it could go into a much higher crisis level and that needs to be followed for. I would recommend that you redo the labs in a month and do an in person or telemed visit here to see if the labs are better.  Please work on this issues as you can clearly see the labs are worse.  1/24/23 labs: cbc : rbc low  mcv 99, platelets 88,  glucose 168, cr 0.76, bilirubin 2.2, alk phos 125, ast 62, alt 18 1.4 MELD 13 peth pending Has reduced etoh and went 1 week in early january and none since 19th on jan discussed the bilirubin and has had a cold and could have impacted it   Patient will be out of the country feb 11-march 2 due to vacation viking cruise and he will do this. Discussed diet while on the cruise and encouraged him on the weight loss prior he was 208 and now 194 and he will stay off the etoh and work on diet and we will monitor this  EGD due in may   recap Aug 10 ultrasound shows liver with increased echogenicity and no lesions. No dilated bile ducts seen. Gallbladder decompressed with mild wall thickening.  Gracia sign is negative. Pancreas visualized portions unremarkable. Right kidney and left kidney 13 cm with no hydronephrosis. Spleen slightly enlarged at 13.8 cm. Liver vessels were not well evaluated due to the increased echogenicity. Trace ascites was seen. Overall the liver appeared to be fatty with the hepatic vasculature is not well seen due to poor penetration.  Splenomegaly was noted.  Trace ascites was seen.  Important to stay on a low-salt diet. I believe that we may be able to use this ultrasound as a justification to do a more advanced scan such as an MRI next time.  Your February 2022 MRI did not show any concerning lesions but did have the perfusion anomalies and I believe this scan was done as the MRI was declined.  Given the limitations seen of the ultrasound we will be able to then order the MRI for the next scan.  [place] notified insurance denied mri so jovanna do u.s and as expect if they cannot see perfusion changes can then ask for mri then.   May 27 egd sent by Dr Borges. Grade 1 esophageal varices seen in middle third esophagus and lower third of esophagis. Mild portal hypertensive gastropathy seen in stomach. No varices in stomach. Duodenum normal. Good to see no banding needed. Plan for redo in 1 yr.  Says weight is down a little. Prior BMI 43 and needs to work on this given fatty liver and says heart team cleared to exercise more. He says 3 weeks ago at primary 8 pounds.  s/p EGD 12/17/21- with dr. borges showing grade II EV, s/p 3 bands in distal esophagus, moderate portal hypertensive gastropathy, no gastric varices, no active bleeding.     February 5, 2022 MRI Lower thorax was normal. Liver showed fat deposition by fat fraction of 15 to 20% and desired is less than 6.4%. They do see chronic liver disease changes including lobar redistribution and nodular contour.   Perfusion anomalies seen in the liver which usually lead to a 6-month surveillance for this. Periesophageal, perigastric, and perisplenic varices seen as well as recanalized umbilical vein although but suggestive of portal hypertension is present. Spleen is 13 cm. Pancreas was normal. Kidneys show simple left renal cyst. Colon shows signs of diverticulosis but without inflammation.  Important for you to be on a high-fiber diet with this and make sure that your primary provider is aware of same. Atherosclerosis seen of the aorta without aneurysm.  Please share with primary provider as well. May want to check lipid levels. Degenerative changes seen of the spine.  Labs 1/14/22- afp 5.2 normal, glucose 98 normal, creatinine 0.66 slightly low (prev 0.65), sodium 141 normal, tbili 0.7 normal, alk phos 121 (prev 123), ast 77 high (prev 81), alt 34 normal (prev 23) hemoglobin 11.9 low (prev 10.6), wbc 6.6 normal, platelets 196 normal, rdw 17.6 high and  monocytes 1.0 high (please share with primary md), inr 1.1 normal, MELD 7, MELD-NA 7.  He was evaluated at [facility] on 10/7/21-10/11/21 for hgb 6.8 (also on eliquis)- admitted and given 2 units blood, hgb up to 9.1. Not enough fluid to tap per US.  No scopes performed.    He notes 3 days of small volume hematemesis in middle of Sept.  Wasn't eating much at the time.   He noted systolic BP <100 on several occasions since hospitalization.  They stopped carvedilol.   He was told he had fatty liver 2 years ago. states 'stomach getting bigger' in april 2021 timeframe. per pt was drinking more after jan 2021 and then stopped on may 15, 2021 when he was hospitalized.  Has since started drinking again.    Drinking 2 glasses of wine per day at time of Oct 2021 office visit.  Previously drinking 3-4 drinks daily.   Encouraged absolute alcohol cessation and rehab/therapy.   alk phos 127 (prev 129), ast 48 (prev 53), alt 14 (prev 16), MELD 7 on 10/6/21 labs.  Weight 237 11/2020.  205 1/2021 after stomach flu, 225 prior to para on 5/16/21.  Weight 216 in June 2021.  Weight 212 August.  EGD 5/20/21- 2 cords of small nonbleeding grade 1-2 esophgeal varices in the distal esophagus.  Most recent MRI 6/26/21 showing trace ascites (prior moderate on CT in May 2021)- on lasix 20 mg, spironolactone 12.5 mg/day and doing well with this.  Eating less salt but could do better with this.  No HSE, no signs of GI bleeding, no abdominal pain, jaundice, pruritus, fevers, chills, increased abdominal distention.  Was recommended to go to ED after 10/6/21 labs showing hgb 6.8.    July 26 MRI- Lower thorax was normal.  Liver showed some fat deposition as well as changes of chronic liver disease including lobar redistribution and nodular contour.  They also see some delayed particular enhancement of the liver which would be compatible with fibrosis. They see perfusion changes throughout the liver but no suspicious lesions.   Typically we redo an MRI in 6 months to follow these perfusion anomalies. They see varices near the esophagus, stomach and spleen area.  Some of the paraesophageal varices are submucosal.  So this is important to follow with egd as you are doing. The gallbladder and biliary tree was normal.   The spleen was 13.1 cm which many would consider slightly enlarged.  Pancreas was normal.  Simple left renal cyst was seen but no size given.  Some nonspecific lymph nodes were seen near the liver. They feel that those lymph nodes are likely reactive. You do have trace ascites so would be very important for you to remain on a low-salt diet and on your diuretics that you are on.  Low-salt diet is really the basis for the ascites control and then the diuretics help out more if you are already on that type of diet. Some mild degenerative changes were seen of the spine. Overall we would recommend repeat the MRI in 6 months. Important to note that on the previous CT from May of this year you had a cirrhotic appearance of liver but moderate intra-abdominal ascites that as well as the probable varices as well. The EGD was done on May 20 of this year and it showed 2 cords of small grade 1-2 esophageal varices that were seen.  hep c negative, iron sat low 7%-follow up with pcp or gi for this. ceruloplasmin 32. ferritin low 24. asma neg. ama neg. alpha 1 MM. shavonne neg. ast 34, alt 9. hgb low 9.8-need to see gi or pcp. plt 239. monocytes 1200.   CT triple phase 5/21/21 showed no definitive evidence of HCC, no enhancing liver masses, cirrhotic appearance of the liver with small to moderate intra-abdominal ascites and probable upper abdominal varices.  Esophageal and gastric varices may be present, patent portal veins.   Paracentesis 5/20/21 with 8.2L of fluid removed- path with numerous meseothelial cells and histiocytes, no atypical or malignant cells present.  Colonoscopy 5/20/21- severe left sided diverticulosis, tortuous left colon, 4 mm sessile sigmoid colon polyp removed, small nonbleeding internal hemorrhoids, limited exam beause of suboptimal prep, repeat 3 years.  EGD 5/20/21- 2 cords of small nonbleeding grade 1-2 esophgeal varices in the distal esophagus.    U/S doppler 5/19/21- hepatopetal flow of the liver. Pt here for follow up.  echo showed EF 55-60%

## 2024-08-05 NOTE — PHYSICAL EXAM LYMPHATIC:
Neck , no lymphadenopathy Tetracycline Counseling: Patient counseled regarding possible photosensitivity and increased risk for sunburn.  Patient instructed to avoid sunlight, if possible.  When exposed to sunlight, patients should wear protective clothing, sunglasses, and sunscreen.  The patient was instructed to call the office immediately if the following severe adverse effects occur:  hearing changes, easy bruising/bleeding, severe headache, or vision changes.  The patient verbalized understanding of the proper use and possible adverse effects of tetracycline.  All of the patient's questions and concerns were addressed. Patient understands to avoid pregnancy while on therapy due to potential birth defects.